# Patient Record
Sex: FEMALE | Race: ASIAN | NOT HISPANIC OR LATINO | Employment: PART TIME | URBAN - METROPOLITAN AREA
[De-identification: names, ages, dates, MRNs, and addresses within clinical notes are randomized per-mention and may not be internally consistent; named-entity substitution may affect disease eponyms.]

---

## 2017-01-03 LAB
ABO + RH BLD: NORMAL
C TRACH RRNA SPEC QL PROBE: NEGATIVE
HBV SURFACE AG SERPL QL IA: NEGATIVE
HCT VFR BLD AUTO: 40 %
HEMOGLOBIN BANDS: NORMAL
HGB BLD-MCNC: 13 G/DL
HIV 1+2 AB+HIV1 P24 AG SERPL QL IA: NEGATIVE
INDIRECT COOMBS: NEGATIVE
MCV RBC AUTO: 86 FL (ref 82–108)
N GONORRHOEAE, AMPLIFIED DNA: NEGATIVE
PLATELET # BLD AUTO: 373 K/ΜL (ref 150–399)
RPR: NEGATIVE
RUBELLA IMMUNE STATUS: NORMAL
URINE CULTURE, ROUTINE: NEGATIVE

## 2017-01-16 ENCOUNTER — TELEPHONE (OUTPATIENT)
Dept: OBSTETRICS AND GYNECOLOGY | Facility: CLINIC | Age: 32
End: 2017-01-16

## 2017-01-16 NOTE — TELEPHONE ENCOUNTER
Returned call. Pt stated that she went to another clinic and had a pos UPT, BHcg of 57,000 and an ultrasound. Pt denies being given a due date. Pt stated that she wasn't given any information about pregnancy and wants to see another OB/GYN. Scheduled appt per pt request. Pt voiced understanding.

## 2017-01-16 NOTE — TELEPHONE ENCOUNTER
----- Message from Antonia Watson sent at 1/16/2017  9:11 AM CST -----  Contact: Self  Pt is calling in regards of scheduling an initial OB appt. LMP = no cycle. The pt states that her last physician states that she is having an infection and is needing treatment if possible. The pt also states that the clinic that she went to didn't give her information regarding her pregnancy, which is why she is wanting to transfer care.The pt can be reached at [phone]. Thanks KG

## 2017-01-16 NOTE — TELEPHONE ENCOUNTER
----- Message from Destiney Hess sent at 1/16/2017 10:37 AM CST -----  Contact: pt   X_  1st Request  _  2nd Request  _  3rd Request    Who:TIFFANIE SANDOVAL [[id number]]    Why: Patient states she is retuning a call     What Number to Call Back: [phone]    When to Expect a call back: (Before the end of the day)   -- if call after 3:00 call back will be tomorrow.

## 2017-01-24 ENCOUNTER — INITIAL PRENATAL (OUTPATIENT)
Dept: OBSTETRICS AND GYNECOLOGY | Facility: CLINIC | Age: 32
End: 2017-01-24
Payer: MEDICAID

## 2017-01-24 VITALS — SYSTOLIC BLOOD PRESSURE: 118 MMHG | DIASTOLIC BLOOD PRESSURE: 74 MMHG | WEIGHT: 138.88 LBS

## 2017-01-24 DIAGNOSIS — Z34.01 ENCOUNTER FOR SUPERVISION OF NORMAL FIRST PREGNANCY IN FIRST TRIMESTER: ICD-10-CM

## 2017-01-24 PROBLEM — Z34.00 SUPERVISION OF NORMAL FIRST PREGNANCY: Status: ACTIVE | Noted: 2017-01-24

## 2017-01-24 PROCEDURE — 99999 PR PBB SHADOW E&M-EST. PATIENT-LVL III: CPT | Mod: PBBFAC,,, | Performed by: OBSTETRICS & GYNECOLOGY

## 2017-01-24 PROCEDURE — 99202 OFFICE O/P NEW SF 15 MIN: CPT | Mod: TH,S$PBB,, | Performed by: OBSTETRICS & GYNECOLOGY

## 2017-01-24 PROCEDURE — 99213 OFFICE O/P EST LOW 20 MIN: CPT | Mod: PBBFAC,PO | Performed by: OBSTETRICS & GYNECOLOGY

## 2017-01-24 RX ORDER — ONDANSETRON 4 MG/1
8 TABLET, ORALLY DISINTEGRATING ORAL EVERY 8 HOURS
Qty: 60 TABLET | Refills: 3 | Status: SHIPPED | OUTPATIENT
Start: 2017-01-24 | End: 2017-06-29 | Stop reason: SDUPTHER

## 2017-01-24 NOTE — MR AVS SNAPSHOT
[facility]  [address] [facility] [address]  Phone: [phone]                  Tere Peña   2017 10:00 AM   Initial Prenatal    Description:  Unknown : 1985   Provider:  Maya Falcon MD   Department:  [facility]           Reason for Visit     Initial Prenatal Visit           Diagnoses this Visit        Comments    Encounter for supervision of normal first pregnancy in first trimester                To Do List           Goals (5 Years of Data)     None      Follow-Up and Disposition     Return in about 4 weeks (around 2017) for ROB. [facility]     [facility] Nurse Care Line -  Assistance  Registered nurses in the [facility] provide clinical advisement, health education, appointment booking, and other advisory services.  Call for this free service at [phone].             Medications           Message regarding Medications     Verify the changes and/or additions to your medication regime listed below are the same as discussed with your clinician today.  If any of these changes or additions are incorrect, please notify your healthcare provider.             Verify that the below list of medications is an accurate representation of the medications you are currently taking.  If none reported, the list may be blank. If incorrect, please contact your healthcare provider. Carry this list with you in case of emergency.           Current Medications     PNV95/FERROUS FUMARATE/FA (PRENATAL MULTIVITAMINS ORAL) Take by mouth.           Clinical Reference Information           Prenatal Vitals     Enc. Date GA Prenatal Vitals Prenatal Pulse Pain Level Urine Albumin/Glucose Edema Presentation Dilation/Effacement/Station    17 8w0d 118/74 / 63 kg (138 lb 14.2 oz)   5 Trace / Trace         Vital Signs - Last Recorded  Most recent update: 2017 10:15 AM by Ashley Parada RN    BP Wt LMP             118/74 63 kg (138 lb 14.2  oz) (LMP Unknown)         Allergies as of 1/24/2017     No Known Allergies      Immunizations Administered on Date of Encounter - 1/24/2017     None      Orders Placed During Today's Visit      Normal Orders This Visit    ABO/Rh     CBC Without Differential     Julisa test, indirect, qualitative     Hbsag - Prenatal     Hemoglobin Electrophoresis,Hgb A2 Rupert.     HIV-1 and HIV-2 antibodies     Ob Cervical Screen     RPR     Rubella antibody, IgG     Urine culture     Future Labs/Procedures Expected by Expires    US OB/GYN Procedure ([facility])  As directed 1/24/2018      [facility] Sign-Up     Activating your [facility] account is as easy as 1-2-3!     1) Visit [email or web address], select Sign Up Now, enter this activation code and your date of birth, then select Next.  [email or web address]  Expires: 2/24/2017  9:48 PM      2) Create a username and password to use when you visit [facility] in the future and select a security question in case you lose your password and select Next.    3) Enter your e-mail address and click Sign Up!    Additional Information  If you have questions, please e-mail [email or web address] or call [phone] to talk to our [facility] staff. Remember, [facility] is NOT to be used for urgent needs. For medical emergencies, dial 911.

## 2017-01-24 NOTE — PROGRESS NOTES
Pt doing well. This is her first pregnancy. No PMH other than PCOS. No surgical history. She is taking PNV. Was seen at outside hospital for initial visit (all labs normal). Has not had dating US. Unsure of LMP as she has PCOS. Reports irregular cramping. Has nausea daily and gagging. No emesis. Has prescription at home as needed -has not yet used it. Denies vaginal bleeding. Is having some tailbone pain for 2 weeks. Worse with sitting. No falls or trauma reported.    VS reviewed.  PE benign. Roughly 8 weeks sized uterus.  Pap normal 1/3/17  Ordered today: TVUS dating scan, flu vaccine. Pt interested in centering.  Precautions given. Discussed safe foods, medications, activities, etc.    RTC: 4 weeks

## 2017-01-25 ENCOUNTER — RESEARCH ENCOUNTER (OUTPATIENT)
Dept: RESEARCH | Facility: HOSPITAL | Age: 32
End: 2017-01-25

## 2017-01-25 ENCOUNTER — PROCEDURE VISIT (OUTPATIENT)
Dept: OBSTETRICS AND GYNECOLOGY | Facility: CLINIC | Age: 32
End: 2017-01-25
Payer: MEDICAID

## 2017-01-25 DIAGNOSIS — Z34.01 ENCOUNTER FOR SUPERVISION OF NORMAL FIRST PREGNANCY IN FIRST TRIMESTER: ICD-10-CM

## 2017-01-25 PROCEDURE — 76801 OB US < 14 WKS SINGLE FETUS: CPT | Mod: PBBFAC | Performed by: OBSTETRICS & GYNECOLOGY

## 2017-01-25 PROCEDURE — 76801 OB US < 14 WKS SINGLE FETUS: CPT | Mod: 26,S$PBB,, | Performed by: OBSTETRICS & GYNECOLOGY

## 2017-01-25 NOTE — PROGRESS NOTES
2016.165.B TREETOP Consent.     I met with Ms. Peña and FOJOSE C. She is pregnant and here for a visit. I met them in OB ultrasound and escorted them to CTU. We discussed the study in detail, including the purpose, numbers/length, procedures, risk/benefit, cost/payment, contacts (investigators/IRB), alternatives/voluntary nature/withdrawal, confidentiality/HIPPA. Dr. Marroquin  was available for questions. She verbalized understanding and had no questions at this time. She consented to optional blood storage and genetic sequencing. The consent form was signed on 1/25/17 at 9:45 am and patient was given a copy.     She is not participating in any other research studies.

## 2017-02-06 ENCOUNTER — TELEPHONE (OUTPATIENT)
Dept: OBSTETRICS AND GYNECOLOGY | Facility: CLINIC | Age: 32
End: 2017-02-06

## 2017-02-06 ENCOUNTER — PATIENT MESSAGE (OUTPATIENT)
Dept: OBSTETRICS AND GYNECOLOGY | Facility: CLINIC | Age: 32
End: 2017-02-06

## 2017-02-06 DIAGNOSIS — M54.9 BACK PAIN AFFECTING PREGNANCY: ICD-10-CM

## 2017-02-06 DIAGNOSIS — Z34.01 ENCOUNTER FOR SUPERVISION OF NORMAL FIRST PREGNANCY IN FIRST TRIMESTER: Primary | ICD-10-CM

## 2017-02-06 DIAGNOSIS — O99.891 BACK PAIN AFFECTING PREGNANCY: ICD-10-CM

## 2017-02-06 NOTE — TELEPHONE ENCOUNTER
"States still having problems with "tailbone pain", states was having before she got pregnant and now getting worse. Rec: try warm compresses and Tylenol. Pt states wants to talk to Dr Falcon about chromosome testing, states concerned about this and her parents are concerned. States she thinks she is farther along in pregnancy and has questions about ultrasound. Will pass message along to Dr Falcon.  "

## 2017-02-06 NOTE — TELEPHONE ENCOUNTER
----- Message from Sinai Ernst sent at 2/6/2017  1:04 PM CST -----  Contact: self  Patient states experiencing severe in buttock area   Please call pt for more detail info [phone]

## 2017-02-06 NOTE — TELEPHONE ENCOUNTER
----- Message from Raudel Sims sent at 2/6/2017  9:22 AM CST -----  12wks ob pt states that she is having tail bone around the whole butt. Pt can be reached at [phone].

## 2017-02-06 NOTE — TELEPHONE ENCOUNTER
Returned call. Pt wants to speak with Dr Falcon concerning genetic testing. Pt has several questions. Pt can be reached at .

## 2017-02-06 NOTE — TELEPHONE ENCOUNTER
Returned call.  Desires nt testing.  She continue to have pain near her coccyx.  Please schedule with physical therapy

## 2017-02-10 NOTE — TELEPHONE ENCOUNTER
Anais in Physical Therapy called back and stated that they spoke with pt on Wednesday and she stated that her pain has subsided and she no longer needs physical therapy.

## 2017-02-17 ENCOUNTER — LAB VISIT (OUTPATIENT)
Dept: LAB | Facility: OTHER | Age: 32
End: 2017-02-17
Attending: OBSTETRICS & GYNECOLOGY
Payer: MEDICAID

## 2017-02-17 ENCOUNTER — OFFICE VISIT (OUTPATIENT)
Dept: MATERNAL FETAL MEDICINE | Facility: CLINIC | Age: 32
End: 2017-02-17
Attending: OBSTETRICS & GYNECOLOGY
Payer: MEDICAID

## 2017-02-17 VITALS — WEIGHT: 141.75 LBS

## 2017-02-17 DIAGNOSIS — Z36.82 ENCOUNTER FOR NUCHAL TRANSLUCENCY TESTING: ICD-10-CM

## 2017-02-17 DIAGNOSIS — Z34.01 ENCOUNTER FOR SUPERVISION OF NORMAL FIRST PREGNANCY IN FIRST TRIMESTER: ICD-10-CM

## 2017-02-17 DIAGNOSIS — Z36.89 ENCOUNTER FOR FETAL ANATOMIC SURVEY: Primary | ICD-10-CM

## 2017-02-17 PROCEDURE — 99999 PR PBB SHADOW E&M-EST. PATIENT-LVL I: CPT | Mod: PBBFAC,,,

## 2017-02-17 PROCEDURE — 81508 FTL CGEN ABNOR TWO PROTEINS: CPT

## 2017-02-17 PROCEDURE — 36415 COLL VENOUS BLD VENIPUNCTURE: CPT

## 2017-02-17 PROCEDURE — 76801 OB US < 14 WKS SINGLE FETUS: CPT | Mod: 26,S$PBB,, | Performed by: PEDIATRICS

## 2017-02-17 PROCEDURE — 99499 UNLISTED E&M SERVICE: CPT | Mod: S$PBB,,, | Performed by: PEDIATRICS

## 2017-02-17 PROCEDURE — 76813 OB US NUCHAL MEAS 1 GEST: CPT | Mod: 26,S$PBB,, | Performed by: PEDIATRICS

## 2017-02-17 NOTE — PROGRESS NOTES
Indication: nuchal translucency screening(MARILEE SARGENT).   ____________________________________________________________________________  History: Age: 31 years. LMP not sure.  ____________________________________________________________________________  Dating:    Previous Scan on: 01/25/17 EDC: 08/26/17 GA by prev. scan: 12w6d      Best Overall Assessment: 01/25/17 EDC: 08/26/17 Assessed GA: 12w6d  The Best Overall Assessment is based on the ultrasound examination on 01/25/17.  The calculation of the gestational age was then based on  CRL.  ____________________________________________________________________________  First Trimester Scan:  Ward gestation.      Biometry:    CRL 69.4 mm 66th% 13w1d (12w4d to 13w5d)  NT 1.41 Mm      Fetal Anatomy:    Skull / Brain: appears normal. Abdomen: appears normal. Stomach: visible. Bladder: visible. Hands: both visible. Feet: both visible.      Fetal heart activity: present. Fetal heart rate: 165 bpm.   Amniotic fluid: normal.   Cord: 3 vessels. Placenta: posterior.       Summary of Ultrasound Findings:  Transabdominal US. U/S machine: trinket. U/S view: good.       Impression: normal intrauterine pregnancy.     ____________________________________________________________________________  Maternal Structures:    Uterus: normal.  Cervix: normal.  Right Ovary: normal.   Right Ovary size: 24 mm x 28 mm x 21 mm. Volume: 7.4 ml.   Left Ovary: normal.   Left Ovary size: 26 mm x 34 mm x 17 mm. Volume: 7.9 ml.  Cul de Sac / Pouch of Salbador: no free fluid visible.  ____________________________________________________________________________  Report Summary:      Impression:     Single viable intrauterine pregnancy consistent with established dating.  Fetus grossly WNL with normal cardiac activity.    No evidence of nuchal translucency thickening visualized today.  Normal uterus, cervix and adnexae as noted above.    No fluid seen in cul-de-sac.     Recommendations:     First  portion of sequential screening completed today. Results to be sent to primary pregnancy care provider. Recommend to complete second blood draw beyond 15 weeks EGA of pregnancy. Ultrasound for fetal anatomical survey scheduled by MFM today for 19-20 weeks EGA.     Thank you for allowing us to participate in the care of your patients.  If you have any questions concerning today's consultation, please feel free to contact me or one of my partners.  We can be reached at [phone] during normal business hours.  If you have a question after normal business hours, please contact Labor and Delivery [phone] and the unit secretary will page our on call physician.

## 2017-02-17 NOTE — LETTER
February 17, 2017      Maya Falcon MD  [address]  [address]  [facility] [address]           [facility]  [address]  [facility] [address]  Phone: [phone]          Patient: Tere Peña   MR Number: [id number]   YOB: 1985   Date of Visit: 2/17/2017       Dear Dr. Maya Falcon:    Thank you for referring Tere Peña to me for evaluation. Attached you will find relevant portions of my assessment and plan of care.    If you have questions, please do not hesitate to call me. I look forward to following Tere Peña along with you.    Sincerely,    Allie Patel MD    Enclosure  CC:  No Recipients    If you would like to receive this communication electronically, please contact [email or web address] or [phone] to request more information on [employer] Link access.    For providers and/or their staff who would like to refer a patient to [facility], please contact us through our one-stop-shop provider referral line, [facility], at [phone].    If you feel you have received this communication in error or would no longer like to receive these types of communications, please e-mail [email or web address]

## 2017-02-20 LAB
B-HCG (M.O.M.) (SS1): NORMAL
CRL MEASUREMENT (SS1): 69.4 MM
DOWN SYNDROME (<1:25) (SS1): NORMAL
DS BASED ON MAT. AGE (SS1): NORMAL
ETHNIC ORIGIN (SS1): NORMAL
GESTATIONAL AGE (DAYS)(SS1): 1
GESTATIONAL AGE (WEEKS)(SS1): 13
HCG (SS1): 113.6 IU/ML
INSULIN DEPEND. DIABETES (SS1): NORMAL
MATERNAL AGE AT EDD (YRS) (SS1): 32
MATERNAL WEIGHT (LBS) (SS1): 142
MULTIPLE GESTATION (SS1): NORMAL
NASAL BONE (SS1): NORMAL
NUCHAL TRANSL. (M.O.M.) (SS1): NORMAL
NUCHAL TRANSLUCENCY (SS1): 1.4 MM
PAPP-A (M.O.M.) (SS1): NORMAL
PAPP-A (SS1): NORMAL NG/ML
SEQ. SCREEN PART 1: NEGATIVE
SEQUENTIAL SCREEN I INTERP.: NORMAL
TRISOMY 18 (<1:100) (SS1): NORMAL
ULTRASOUND DATE (SS1): NORMAL

## 2017-02-21 ENCOUNTER — ROUTINE PRENATAL (OUTPATIENT)
Dept: OBSTETRICS AND GYNECOLOGY | Facility: CLINIC | Age: 32
End: 2017-02-21
Payer: MEDICAID

## 2017-02-21 VITALS — WEIGHT: 140.19 LBS | SYSTOLIC BLOOD PRESSURE: 110 MMHG | DIASTOLIC BLOOD PRESSURE: 60 MMHG

## 2017-02-21 DIAGNOSIS — N91.2 AMENORRHEA: ICD-10-CM

## 2017-02-21 DIAGNOSIS — Z34.02 ENCOUNTER FOR SUPERVISION OF NORMAL FIRST PREGNANCY IN SECOND TRIMESTER: Primary | ICD-10-CM

## 2017-02-21 DIAGNOSIS — Z32.01 POSITIVE PREGNANCY TEST: ICD-10-CM

## 2017-02-21 PROCEDURE — 99213 OFFICE O/P EST LOW 20 MIN: CPT | Mod: TH,S$PBB,, | Performed by: STUDENT IN AN ORGANIZED HEALTH CARE EDUCATION/TRAINING PROGRAM

## 2017-02-21 PROCEDURE — 99999 PR PBB SHADOW E&M-EST. PATIENT-LVL II: CPT | Mod: PBBFAC,,, | Performed by: STUDENT IN AN ORGANIZED HEALTH CARE EDUCATION/TRAINING PROGRAM

## 2017-02-21 PROCEDURE — 87480 CANDIDA DNA DIR PROBE: CPT

## 2017-02-21 PROCEDURE — 99212 OFFICE O/P EST SF 10 MIN: CPT | Mod: PBBFAC,PO | Performed by: STUDENT IN AN ORGANIZED HEALTH CARE EDUCATION/TRAINING PROGRAM

## 2017-02-21 RX ORDER — FLUCONAZOLE 150 MG/1
150 TABLET ORAL ONCE
Qty: 1 TABLET | Refills: 1 | Status: SHIPPED | OUTPATIENT
Start: 2017-02-21 | End: 2017-02-21

## 2017-02-21 NOTE — PROGRESS NOTES
Complaints today: thinks she has a yeast infection for the past 3 days. Also having nausea and vomiting all day most days. Weight loss 64.3 > 63.6 since last seen in clinic.    Visit Vitals    /60    Wt 63.6 kg (140 lb 3.4 oz)    LMP  (LMP Unknown)       31 y.o., at 13w3d by Estimated Date of Delivery: 17  Patient Active Problem List   Diagnosis    Supervision of normal first pregnancy     OB History    Para Term  AB SAB TAB Ectopic Multiple Living   1               # Outcome Date GA Lbr Bhupinder/2nd Weight Sex Delivery Anes PTL Lv   1 Current                   Dating reviewed  Allergies and problem list reviewed and updated  Medical and surgical history reviewed  Prenatal labs reviewed and updated    Physical Exam:  ABD: soft, gravid, nontender  FHT verified  SSE: with copious white discharge, affirm collected; cervical and vaginal mucosa is intact and normal in appearance    Assessment:  IUP @ 13w3d    Plan:     Nausea, vomiting  doxylamine: (eg, Unisom Sleep Tabs): One-half of the 25 mg OTC tablet nightly  Pyridoxine: 25 mg 3-4x/day    IUP  RTC 4 weeks  Anatomy US has been ordered    Yeast infection  Affirm sent  Diflucan 150 mg to pharmacy

## 2017-02-21 NOTE — MR AVS SNAPSHOT
[facility]  [address] [facility] [address]  Phone: [phone]                  Tere Peña   2017 8:30 AM   Routine Prenatal    Description:  Female : 1985   Provider:  Torin Castaneda MD   Department:  [facility]           Reason for Visit     Routine Prenatal Visit     Tailbone Pain           Diagnoses this Visit        Comments    Encounter for supervision of normal first pregnancy in second trimester    -  Primary     Positive pregnancy test         Amenorrhea                To Do List           Future Appointments        Provider Department Dept Phone    2017 1:00 PM Maya Falcon MD [facility] OB/ -[phone]      Goals (5 Years of Data)     None      Follow-Up and Disposition     Return in about 4 weeks (around 3/21/2017).      [facility]     [facility] Nurse Care Line - 24/7 Assistance  Registered nurses in the [facility] provide clinical advisement, health education, appointment booking, and other advisory services.  Call for this free service at [phone].             Medications           Message regarding Medications     Verify the changes and/or additions to your medication regime listed below are the same as discussed with your clinician today.  If any of these changes or additions are incorrect, please notify your healthcare provider.             Verify that the below list of medications is an accurate representation of the medications you are currently taking.  If none reported, the list may be blank. If incorrect, please contact your healthcare provider. Carry this list with you in case of emergency.           Current Medications     ondansetron (ZOFRAN-ODT) 4 MG TbDL Take 2 tablets (8 mg total) by mouth every 8 (eight) hours.    PNV95/FERROUS FUMARATE/FA (PRENATAL MULTIVITAMINS ORAL) Take by mouth.           Clinical Reference Information           Prenatal Vitals     Enc. Date GA Prenatal Vitals  Prenatal Pulse Pain Level Urine Albumin/Glucose Edema Presentation Dilation/Effacement/Station    2/21/17 13w3d 110/60 / 63.6 kg (140 lb 3.4 oz)   0 Negative / Negative None / None      2/17/17 12w6d  / 64.3 kg (141 lb 12.1 oz)           1/24/17 8w0d 118/74 / 63 kg (138 lb 14.2 oz)   5 Trace / Trace         Your Vitals Were     BP Weight Last Period             110/60 63.6 kg (140 lb 3.4 oz) (LMP Unknown)         Allergies as of 2/21/2017     No Known Allergies      Immunizations Administered on Date of Encounter - 2/21/2017     None      Language Assistance Services     ATTENTION: Language assistance services are available, free of charge. Please call [phone].      ATENCIÓN: Si habla judied, tiene a clancy disposición servicios gratuitos de asistencia lingüística. Llame al [phone].     CHÚ Ý: N?u b?n nói Ti?ng Vi?t, có các d?ch v? h? tr? ngôn ng? mi?n phí dành cho b?n. G?i s? [phone].         [facility] complies with applicable Federal civil rights laws and does not discriminate on the basis of race, color, national origin, age, disability, or sex.

## 2017-02-21 NOTE — LETTER
February 21, 2017    Tere Peña  [address] [place] [address]             [facility]  [address] [facility] [address]  Phone: [phone] To whom it may concern:    I am seeing Ms. Peña for her pregnancy.  Estimated Date of Delivery: 8/26/17.  She has my permission to exercise at the gym during this pregnancy.      If you have any questions or concerns, please don't hesitate to call.    Sincerely,        Maya Falcon MD

## 2017-02-22 ENCOUNTER — PATIENT MESSAGE (OUTPATIENT)
Dept: OBSTETRICS AND GYNECOLOGY | Facility: CLINIC | Age: 32
End: 2017-02-22

## 2017-02-22 LAB
CANDIDA RRNA VAG QL PROBE: POSITIVE
G VAGINALIS RRNA GENITAL QL PROBE: NEGATIVE
T VAGINALIS RRNA GENITAL QL PROBE: NEGATIVE

## 2017-02-22 RX ORDER — FLUCONAZOLE 150 MG/1
150 TABLET ORAL DAILY
Qty: 1 TABLET | Refills: 1 | Status: SHIPPED | OUTPATIENT
Start: 2017-02-22 | End: 2017-02-23

## 2017-03-21 ENCOUNTER — ROUTINE PRENATAL (OUTPATIENT)
Dept: OBSTETRICS AND GYNECOLOGY | Facility: CLINIC | Age: 32
End: 2017-03-21
Payer: MEDICAID

## 2017-03-21 VITALS — WEIGHT: 145.75 LBS | DIASTOLIC BLOOD PRESSURE: 56 MMHG | SYSTOLIC BLOOD PRESSURE: 112 MMHG

## 2017-03-21 DIAGNOSIS — Z34.02 ENCOUNTER FOR SUPERVISION OF NORMAL FIRST PREGNANCY IN SECOND TRIMESTER: Primary | ICD-10-CM

## 2017-03-21 PROCEDURE — 99999 PR PBB SHADOW E&M-EST. PATIENT-LVL II: CPT | Mod: PBBFAC,,, | Performed by: STUDENT IN AN ORGANIZED HEALTH CARE EDUCATION/TRAINING PROGRAM

## 2017-03-21 PROCEDURE — 99212 OFFICE O/P EST SF 10 MIN: CPT | Mod: PBBFAC,PO | Performed by: STUDENT IN AN ORGANIZED HEALTH CARE EDUCATION/TRAINING PROGRAM

## 2017-03-21 PROCEDURE — 99213 OFFICE O/P EST LOW 20 MIN: CPT | Mod: TH,S$PBB,, | Performed by: STUDENT IN AN ORGANIZED HEALTH CARE EDUCATION/TRAINING PROGRAM

## 2017-03-21 RX ORDER — BUTALBITAL, ACETAMINOPHEN AND CAFFEINE 50; 325; 40 MG/1; MG/1; MG/1
1 TABLET ORAL EVERY 4 HOURS PRN
Qty: 30 TABLET | Refills: 3 | Status: SHIPPED | OUTPATIENT
Start: 2017-03-21 | End: 2017-04-20

## 2017-03-21 NOTE — MR AVS SNAPSHOT
[facility]  [address] [facility] [address]  Phone: [phone]                  Tere Peña   3/21/2017 10:45 AM   Routine Prenatal    Description:  Female : 1985   Provider:  Torin Castaneda MD   Department:  [facility]           Reason for Visit     Routine Prenatal Visit           Diagnoses this Visit        Comments    Encounter for supervision of normal first pregnancy in second trimester    -  Primary            To Do List           Future Appointments        Provider Department Dept Phone    4/3/2017 8:00 AM ULTRASOUND, [facility]TH [facility] [phone]    2017 1:00 PM Maya Falcon MD [facility] OB/ -[phone]      Goals (5 Years of Data)     None      [facility]     [facility] Care [facility] -  Assistance  Registered nurses in the [facility] provide clinical advisement, health education, appointment booking, and other advisory services.  Call for this free service at [phone].             Medications           Message regarding Medications     Verify the changes and/or additions to your medication regime listed below are the same as discussed with your clinician today.  If any of these changes or additions are incorrect, please notify your healthcare provider.             Verify that the below list of medications is an accurate representation of the medications you are currently taking.  If none reported, the list may be blank. If incorrect, please contact your healthcare provider. Carry this list with you in case of emergency.           Current Medications     ondansetron (ZOFRAN-ODT) 4 MG TbDL Take 2 tablets (8 mg total) by mouth every 8 (eight) hours.    PNV95/FERROUS FUMARATE/FA (PRENATAL MULTIVITAMINS ORAL) Take by mouth.           Clinical Reference Information           Prenatal Vitals     Enc. Date GA Prenatal Vitals Prenatal Pulse Pain Level Urine Albumin/Glucose Edema  Presentation Dilation/Effacement/Station    3/21/17 17w3d 112/56 (A) / 66.1 kg (145 lb 11.6 oz)   0 Negative / Negative       2/21/17 13w3d 110/60 / 63.6 kg (140 lb 3.4 oz)  / 152  0 Negative / Negative None / None      2/17/17 12w6d  / 64.3 kg (141 lb 12.1 oz)           1/24/17 8w0d 118/74 / 63 kg (138 lb 14.2 oz)   5 Trace / Trace         Your Vitals Were     BP Weight Last Period             112/56 66.1 kg (145 lb 11.6 oz) (LMP Unknown)         Allergies as of 3/21/2017     No Known Allergies      Immunizations Administered on Date of Encounter - 3/21/2017     None      Orders Placed During Today's Visit     Future Labs/Procedures Expected by Expires    Maternal Sequential Screen Part 2  3/21/2017 5/20/2018      Language Assistance Services     ATTENTION: Language assistance services are available, free of charge. Please call [phone].      ATENCIÓN: Si habla kwame, tiene a clancy disposición servicios gratuitos de asistencia lingüística. Llame al [phone].     [facility] Ý: N?u b?n nói Ti?ng Vi?t, có các d?ch v? h? tr? ngôn ng? mi?n phí dành cho b?n. G?i s? [phone].         [facility] complies with applicable Federal civil rights laws and does not discriminate on the basis of race, color, national origin, age, disability, or sex.

## 2017-03-21 NOTE — PROGRESS NOTES
Complaints today: Complains of occasional headaches that are not relieved by Tylenol. Also complains of worsening dandruff over the past few weeks.    BP (!) 112/56  Wt 66.1 kg (145 lb 11.6 oz)  LMP  (LMP Unknown)    31 y.o., at 17w3d by Estimated Date of Delivery: 17  Patient Active Problem List   Diagnosis    Supervision of normal first pregnancy     OB History    Para Term  AB SAB TAB Ectopic Multiple Living   1               # Outcome Date GA Lbr Bhupinder/2nd Weight Sex Delivery Anes PTL Lv   1 Current                   Dating reviewed    Allergies and problem list reviewed and updated    Medical and surgical history reviewed    Prenatal labs reviewed and updated    Physical Exam:  ABD: soft, gravid, nontender    Assessment:  30yo G1 at 17.3 wga presents for routine prenatal    Plan:   Fioricet for headache  Continue using Head and Shoulders for dandruff  Seq screen part 2 ordered     follow up 4 Weeks

## 2017-03-21 NOTE — LETTER
March 21, 2017    Tere Peña  [address] [place] [address]             [facility]  [address] [facility] [address]  Phone: [phone] To whom it may concern:    I am seeing Ms. Peña for her pregnancy.  Estimated Date of Delivery: 8/26/17.      If you have any questions or concerns, please don't hesitate to call.    Sincerely,        Maya Falcon MD

## 2017-03-28 ENCOUNTER — TELEPHONE (OUTPATIENT)
Dept: RESEARCH | Facility: HOSPITAL | Age: 32
End: 2017-03-28

## 2017-04-03 ENCOUNTER — OFFICE VISIT (OUTPATIENT)
Dept: MATERNAL FETAL MEDICINE | Facility: CLINIC | Age: 32
End: 2017-04-03
Payer: MEDICAID

## 2017-04-03 ENCOUNTER — LAB VISIT (OUTPATIENT)
Dept: LAB | Facility: OTHER | Age: 32
End: 2017-04-03
Attending: STUDENT IN AN ORGANIZED HEALTH CARE EDUCATION/TRAINING PROGRAM
Payer: MEDICAID

## 2017-04-03 ENCOUNTER — RESEARCH ENCOUNTER (OUTPATIENT)
Dept: RESEARCH | Facility: HOSPITAL | Age: 32
End: 2017-04-03

## 2017-04-03 ENCOUNTER — PATIENT MESSAGE (OUTPATIENT)
Dept: OBSTETRICS AND GYNECOLOGY | Facility: CLINIC | Age: 32
End: 2017-04-03

## 2017-04-03 DIAGNOSIS — Z36.89 ENCOUNTER FOR FETAL ANATOMIC SURVEY: ICD-10-CM

## 2017-04-03 DIAGNOSIS — Z34.02 ENCOUNTER FOR SUPERVISION OF NORMAL FIRST PREGNANCY IN SECOND TRIMESTER: ICD-10-CM

## 2017-04-03 PROCEDURE — 36415 COLL VENOUS BLD VENIPUNCTURE: CPT

## 2017-04-03 PROCEDURE — 76805 OB US >/= 14 WKS SNGL FETUS: CPT | Mod: 26,S$PBB,, | Performed by: PEDIATRICS

## 2017-04-03 PROCEDURE — 99499 UNLISTED E&M SERVICE: CPT | Mod: S$PBB,,, | Performed by: PEDIATRICS

## 2017-04-03 PROCEDURE — 81511 FTL CGEN ABNOR FOUR ANAL: CPT

## 2017-04-03 NOTE — PROGRESS NOTES
2016.165.B TREETOP Consent.      This morning I met with Ms. Mariana and JAMAR after her anatomy scan. After her appointment, I escorted them to  to have blood drawn. After her blood was drawn, she was given her $25.00 gift card and I escorted them to the [facility].      Blood was drawn at 09:10. Placed in centrifuge at room temperature at 09:30. Minimal hemolysis in both tubes. Tubes were combined at 09:40. Twenty 250 ul aliquots were place in the -80 freezer at 09:46.    She reported she has had headaches, nausea, and a yeast infection, all which were prescribed medication for.

## 2017-04-03 NOTE — PROGRESS NOTES
Indication  ========    Fetal anatomy survey.    Method  ======    Transabdominal ultrasound examination. View: Good view.    Pregnancy  =========    Ward pregnancy. Number of fetuses: 1.    Dating  ======    Cycle: regular cycle  Ultrasound examination on: 4/3/2017  GA by U/S based upon: AC, BPD, Femur, HC  GA by U/S 19 w + 5 d  ELVIN by U/S: 8/23/2017  Assigned: The calculation of the gestational age based on CRL.  The Best Overall Assessment is based on the ultrasound examination on 01/25/17.  Assigned GA 19 w + 2 d  Assigned ELVIN: 8/26/2017    General Evaluation  ==============    Cardiac activity: present.  bpm.  Fetal movements: visualized.  Presentation: Unstable lie.  Placenta: posterior.  Umbilical cord: 3 vessel cord.  Amniotic fluid: normal amount.    Fetal Biometry  ============    Fetal Biometry  BPD 46.5 mm 81% 20w 0d Hadlock  OFD 61.1 mm 95% 21w 0d Luis Angel  .0 mm 70% 19w 6d Hadlock  .9 mm 71% 20w 0d Hadlock  Femur 29.2 mm 32% 19w 0d Hadlock  Cerebellum tr 20.9 mm 78% 20w 4d Reyna  CM 6.8 mm 94% Nicolaides  Nuchal fold 3.04 mm  Humerus 30.0 mm 72% 19w 6d Luis Angel   g 64% 19w 4d Hadlock  Calculated by: Hadlock (BPD-HC-AC-FL)  EFW (lb) 0 lb  EFW (oz) 11 oz  Cephalic index 0.76 21% Nicolaides  HC / AC 1.17 49% Hadlock  FL / BPD 0.63 8% Hadlock  FL / AC 0.20 10% Hadlock   bpm    Fetal Anatomy  ============    Cranium: normal  Lateral ventricles: normal  Choroid plexus: normal  Midline falx: normal  Cavum septi pellucidi: normal  Cerebellum: normal  Cisterna magna: normal  Neck: appears normal  Nuchal fold: normal  Lips: normal  Profile: normal  Nose: normal  RVOT: normal  LVOT: normal  Situs: normal  Aortic arch: normal  Ductal arch: not examined  SVC: normal  IVC: normal  3-vessel view: suboptimal  3-vessel-trachea view: suboptimal  Cardiac axis: normal  Cardiac size: normal  Cardiac rhythm: normal  Rt lung: normal  Lt lung: normal  Diaphragm: normal  Cord  insertion: normal  Stomach: normal  Kidneys: normal  Bladder: normal  Abdom. wall: normal  Rt kidney: normal  Lt kidney: normal  Liver: normal  Rt renal artery: normal  Lt renal artery: normal  Cervical spine: normal  Thoracic spine: normal  Lumbar spine: normal  Sacral spine: normal  Rt hand: normal  Lt hand: normal  Rt foot: normal  Lt foot: normal  Gender: male  Wants to know gender: yes    Maternal Structures  ===============    Uterus / Cervix  Uterus: Normal  Cervical length 36.7 mm  Ovaries / Tubes / Adnexa  Rt ovary: Visualized  Lt ovary: Visualized  Pouch of [place]bador / Other Structures  Cul de Sac: Visualized    Impression  =========    Normal fetal anatomy with no obvious abnormalities.  Biometry is consistent with dating.  Normal amniotic fluid volume per qualitative assessment.  Normal placental location without evidence of previa.  Normal appearing cervical length per trans-abdominal screening.    Recommendation  ==============    Suggest repeat scan as you feel clinically indicated.    Thank you for allowing us to participate in the care of your patients. If you have any questions concerning today's consultation feel free to  contact me or one of my partners. We can be reached at [phone] during normal business hours. If you have a question after normal  business hours, please contact Labor and Delivery [phone] and the unit secretary will page our on call physician.

## 2017-04-04 LAB
1ST SAMPLE DATE (SS2): NORMAL
2ND SAMPLE DATE (SS2): NORMAL
ALPHA FETOPROTEIN MATERNAL (SS2): 60.2 NG/ML
DOWN SYNDROME RISK (<1:110) (SS2): NORMAL
ETHNIC ORIGIN (SS2): NORMAL
GEST. AGE (DAYS) 1ST SAMPLE (SS2): 1
GEST. AGE (DAYS) 2ND SAMPLE (SS2): 4
GEST. AGE (WKS) 1ST SAMPLE (SS2): 13
GEST. AGE (WKS) 2ND SAMPLE (SS2): 19
GESTATIONAL AGE METHOD (SS2): NORMAL
HCG (SS2): 24 IU/ML
INHIBIN A (SS2): 181.2 PG/ML
INSULIN DEPEND. DIABETES (SS2): NORMAL
M.O.M. AFP  (<2.20) (SS2): 1.12
M.O.M. HCG (SS2): 1.23
M.O.M. INHIBIN A (SS2): 1
M.O.M. NUCHAL TRANSLUCENCY (SS2): 0.84
M.O.M. PAPP-A (SS2): 2.31
M.O.M. UNCONJ. ESTRIOL (SS2): 0.87
MATERNAL AGE AT EDD (YRS) (SS2): 32
MATERNAL AGE FOR DOWN (SS2): NORMAL
MATERNAL WEIGHT (LBS) (SS2): 142
MULTIPLE GESTATION (SS2): NORMAL
NASAL BONE (SS2): NORMAL
NUCHAL TRANSLUCENCY (SS2): 1.4 MM
PAPP-A (SS2): NORMAL NG/ML
SEQUENTIAL SCREEN PART 2 INTERP: NORMAL
SEQUENTIAL SCREEN PART 2: NEGATIVE
TRISOMY 18 (<1:100) (SS2): NORMAL
UNCONJUGATED ESTRIOL (SS2): 1.56 NG/ML

## 2017-04-20 ENCOUNTER — ROUTINE PRENATAL (OUTPATIENT)
Dept: OBSTETRICS AND GYNECOLOGY | Facility: CLINIC | Age: 32
End: 2017-04-20
Payer: MEDICAID

## 2017-04-20 VITALS — SYSTOLIC BLOOD PRESSURE: 100 MMHG | DIASTOLIC BLOOD PRESSURE: 62 MMHG | WEIGHT: 153 LBS

## 2017-04-20 DIAGNOSIS — Z34.02 ENCOUNTER FOR SUPERVISION OF NORMAL FIRST PREGNANCY IN SECOND TRIMESTER: Primary | ICD-10-CM

## 2017-04-20 PROCEDURE — 99999 PR PBB SHADOW E&M-EST. PATIENT-LVL II: CPT | Mod: PBBFAC,,, | Performed by: OBSTETRICS & GYNECOLOGY

## 2017-04-20 PROCEDURE — 99213 OFFICE O/P EST LOW 20 MIN: CPT | Mod: TH,S$PBB,, | Performed by: OBSTETRICS & GYNECOLOGY

## 2017-04-20 PROCEDURE — 99212 OFFICE O/P EST SF 10 MIN: CPT | Mod: PBBFAC,PO | Performed by: OBSTETRICS & GYNECOLOGY

## 2017-04-20 NOTE — PROGRESS NOTES
Complaints today: none  Good fm.  Denies ctx, vb, lof.  Centering in pregnancy session 1.  Discussed personal goals, prenatal testing, nutrition, healthy choices.    /62  Wt 69.4 kg (153 lb)  LMP  (LMP Unknown)    31 y.o., at 21w5d by Estimated Date of Delivery: 17  Patient Active Problem List   Diagnosis    Supervision of normal first pregnancy     OB History    Para Term  AB SAB TAB Ectopic Multiple Living   1               # Outcome Date GA Lbr Bhupinder/2nd Weight Sex Delivery Anes PTL Lv   1 Current                   Dating reviewed    Allergies and problem list reviewed and updated    Medical and surgical history reviewed    Prenatal labs reviewed and updated    Physical Exam:  ABD: soft, gravid, nontender,     Assessment:  Diagnoses and all orders for this visit:    Encounter for supervision of normal first pregnancy in second trimester         Plan:      follow up 4Weeks, kick counts, labor precautions

## 2017-05-11 ENCOUNTER — ROUTINE PRENATAL (OUTPATIENT)
Dept: OBSTETRICS AND GYNECOLOGY | Facility: CLINIC | Age: 32
End: 2017-05-11
Payer: MEDICAID

## 2017-05-11 VITALS — DIASTOLIC BLOOD PRESSURE: 69 MMHG | WEIGHT: 159 LBS | SYSTOLIC BLOOD PRESSURE: 104 MMHG

## 2017-05-11 DIAGNOSIS — Z34.02 ENCOUNTER FOR SUPERVISION OF NORMAL FIRST PREGNANCY IN SECOND TRIMESTER: Primary | ICD-10-CM

## 2017-05-11 PROCEDURE — 99999 PR PBB SHADOW E&M-EST. PATIENT-LVL I: CPT | Mod: PBBFAC,,, | Performed by: OBSTETRICS & GYNECOLOGY

## 2017-05-11 PROCEDURE — 99211 OFF/OP EST MAY X REQ PHY/QHP: CPT | Mod: PBBFAC,PO | Performed by: OBSTETRICS & GYNECOLOGY

## 2017-05-11 PROCEDURE — 99213 OFFICE O/P EST LOW 20 MIN: CPT | Mod: TH,S$PBB,, | Performed by: OBSTETRICS & GYNECOLOGY

## 2017-05-11 NOTE — PROGRESS NOTES
Complaints today: none  Good fm.  Denies ctx, vb, lof.  Centering session 2 today: Discussed common discomforts in pregnancy. Body changes in pregnancy, healthy teeth and gums  .    /69  Wt 72.1 kg (159 lb)  LMP  (LMP Unknown)    31 y.o., at 24w5d by Estimated Date of Delivery: 17  Patient Active Problem List   Diagnosis    Supervision of normal first pregnancy     OB History    Para Term  AB SAB TAB Ectopic Multiple Living   1               # Outcome Date GA Lbr Bhupinder/2nd Weight Sex Delivery Anes PTL Lv   1 Current                   Dating reviewed    Allergies and problem list reviewed and updated    Medical and surgical history reviewed    Prenatal labs reviewed and updated    Physical Exam:  ABD: soft, gravid, nontender,     Assessment:  Diagnoses and all orders for this visit:    Encounter for supervision of normal first pregnancy in second trimester  -     CBC auto differential; Future  -     OB Glucose Screen; Future         Plan:   follow up labs   follow up 4 Weeks, kick counts, labor precautions

## 2017-05-15 ENCOUNTER — LAB VISIT (OUTPATIENT)
Dept: LAB | Facility: OTHER | Age: 32
End: 2017-05-15
Attending: OBSTETRICS & GYNECOLOGY
Payer: MEDICAID

## 2017-05-15 DIAGNOSIS — Z34.02 ENCOUNTER FOR SUPERVISION OF NORMAL FIRST PREGNANCY IN SECOND TRIMESTER: ICD-10-CM

## 2017-05-15 LAB
BASOPHILS # BLD AUTO: ABNORMAL K/UL
BASOPHILS NFR BLD: 0 %
DIFFERENTIAL METHOD: ABNORMAL
EOSINOPHIL # BLD AUTO: ABNORMAL K/UL
EOSINOPHIL NFR BLD: 1 %
ERYTHROCYTE [DISTWIDTH] IN BLOOD BY AUTOMATED COUNT: 13.2 %
GLUCOSE SERPL-MCNC: 105 MG/DL
HCT VFR BLD AUTO: 33.8 %
HGB BLD-MCNC: 11.3 G/DL
LYMPHOCYTES # BLD AUTO: ABNORMAL K/UL
LYMPHOCYTES NFR BLD: 13 %
MCH RBC QN AUTO: 30.8 PG
MCHC RBC AUTO-ENTMCNC: 33.4 %
MCV RBC AUTO: 92 FL
MONOCYTES # BLD AUTO: ABNORMAL K/UL
MONOCYTES NFR BLD: 4 %
MYELOCYTES NFR BLD MANUAL: 2 %
NEUTROPHILS # BLD AUTO: ABNORMAL K/UL
NEUTROPHILS NFR BLD: 79 %
NEUTS BAND NFR BLD MANUAL: 1 %
PLATELET # BLD AUTO: 268 K/UL
PLATELET BLD QL SMEAR: ABNORMAL
PMV BLD AUTO: 9.3 FL
RBC # BLD AUTO: 3.67 M/UL
WBC # BLD AUTO: 12.47 K/UL

## 2017-05-15 PROCEDURE — 85027 COMPLETE CBC AUTOMATED: CPT

## 2017-05-15 PROCEDURE — 36415 COLL VENOUS BLD VENIPUNCTURE: CPT

## 2017-05-15 PROCEDURE — 85007 BL SMEAR W/DIFF WBC COUNT: CPT

## 2017-05-15 PROCEDURE — 82950 GLUCOSE TEST: CPT

## 2017-05-18 ENCOUNTER — PATIENT MESSAGE (OUTPATIENT)
Dept: OBSTETRICS AND GYNECOLOGY | Facility: CLINIC | Age: 32
End: 2017-05-18

## 2017-06-08 ENCOUNTER — ROUTINE PRENATAL (OUTPATIENT)
Dept: OBSTETRICS AND GYNECOLOGY | Facility: CLINIC | Age: 32
End: 2017-06-08
Payer: MEDICAID

## 2017-06-08 VITALS — WEIGHT: 162 LBS | SYSTOLIC BLOOD PRESSURE: 100 MMHG | DIASTOLIC BLOOD PRESSURE: 67 MMHG

## 2017-06-08 DIAGNOSIS — Z34.03 ENCOUNTER FOR SUPERVISION OF NORMAL FIRST PREGNANCY IN THIRD TRIMESTER: Primary | ICD-10-CM

## 2017-06-08 PROCEDURE — 99213 OFFICE O/P EST LOW 20 MIN: CPT | Mod: TH,S$PBB,, | Performed by: OBSTETRICS & GYNECOLOGY

## 2017-06-08 PROCEDURE — 99999 PR PBB SHADOW E&M-EST. PATIENT-LVL II: CPT | Mod: PBBFAC,,, | Performed by: OBSTETRICS & GYNECOLOGY

## 2017-06-08 PROCEDURE — 99212 OFFICE O/P EST SF 10 MIN: CPT | Mod: PBBFAC,PO | Performed by: OBSTETRICS & GYNECOLOGY

## 2017-06-08 NOTE — PROGRESS NOTES
Complaints today: heartburn  Good fm.  Denies ctx, vb, lof.  Centering session 3 today:  Discussed relaxation, managing stress, breastfeeding, and thinking about your family.    /67   Wt 73.5 kg (162 lb)   LMP  (LMP Unknown)     31 y.o., at 28w5d by Estimated Date of Delivery: 17  Patient Active Problem List   Diagnosis    Supervision of normal first pregnancy     OB History    Para Term  AB Living   1        SAB TAB Ectopic Multiple Live Births             # Outcome Date GA Lbr Bhupinder/2nd Weight Sex Delivery Anes PTL Lv   1 Current                   Dating reviewed    Allergies and problem list reviewed and updated    Medical and surgical history reviewed    Prenatal labs reviewed and updated    Physical Exam:  ABD: soft, gravid, nontender,     Assessment:  Tere was seen today for routine prenatal visit.    Diagnoses and all orders for this visit:    Encounter for supervision of normal first pregnancy in third trimester          Plan:      follow up 2 Weeks, kick counts, labor precautions

## 2017-06-18 ENCOUNTER — PATIENT MESSAGE (OUTPATIENT)
Dept: OBSTETRICS AND GYNECOLOGY | Facility: CLINIC | Age: 32
End: 2017-06-18

## 2017-06-29 ENCOUNTER — ROUTINE PRENATAL (OUTPATIENT)
Dept: OBSTETRICS AND GYNECOLOGY | Facility: CLINIC | Age: 32
End: 2017-06-29
Payer: MEDICAID

## 2017-06-29 VITALS — WEIGHT: 163 LBS | DIASTOLIC BLOOD PRESSURE: 70 MMHG | SYSTOLIC BLOOD PRESSURE: 114 MMHG

## 2017-06-29 DIAGNOSIS — Z34.03 ENCOUNTER FOR SUPERVISION OF NORMAL FIRST PREGNANCY IN THIRD TRIMESTER: Primary | ICD-10-CM

## 2017-06-29 DIAGNOSIS — M65.4 DE QUERVAIN'S TENOSYNOVITIS: ICD-10-CM

## 2017-06-29 PROCEDURE — 99999 PR PBB SHADOW E&M-EST. PATIENT-LVL II: CPT | Mod: PBBFAC,,, | Performed by: OBSTETRICS & GYNECOLOGY

## 2017-06-29 PROCEDURE — 99212 OFFICE O/P EST SF 10 MIN: CPT | Mod: PBBFAC,PO | Performed by: OBSTETRICS & GYNECOLOGY

## 2017-06-29 PROCEDURE — 99213 OFFICE O/P EST LOW 20 MIN: CPT | Mod: TH,S$PBB,, | Performed by: OBSTETRICS & GYNECOLOGY

## 2017-06-29 RX ORDER — ONDANSETRON 4 MG/1
8 TABLET, ORALLY DISINTEGRATING ORAL EVERY 8 HOURS
Qty: 60 TABLET | Refills: 3 | Status: SHIPPED | OUTPATIENT
Start: 2017-06-29

## 2017-06-29 NOTE — PROGRESS NOTES
Complaints today: wrist pain  Good fm.  Denies ctx, vb, lof.  She attended session 4 of centering.  We discussed family planning, safe sex, domestic violence and abuse, fetal brain development,  labor, and thinking about my family for 2 hour.    /70   Wt 73.9 kg (163 lb)   LMP  (LMP Unknown)     31 y.o., at 31w5d by Estimated Date of Delivery: 17  Patient Active Problem List   Diagnosis    Supervision of normal first pregnancy     OB History    Para Term  AB Living   1             SAB TAB Ectopic Multiple Live Births                  # Outcome Date GA Lbr Bhupinder/2nd Weight Sex Delivery Anes PTL Lv   1 Current                   Dating reviewed    Allergies and problem list reviewed and updated    Medical and surgical history reviewed    Prenatal labs reviewed and updated    Physical Exam:  ABD: soft, gravid, nontender,     Assessment:  Diagnoses and all orders for this visit:    Encounter for supervision of normal first pregnancy in third trimester    Other orders  -     ondansetron (ZOFRAN-ODT) 4 MG TbDL; Take 2 tablets (8 mg total) by mouth every 8 (eight) hours.         Plan:   +finklestein's test.  Exercises for Dequervain's given   follow up 2 Weeks, kick counts, labor precautions

## 2017-06-29 NOTE — LETTER
June 29, 2017    Tere Peña  [address] [place] [address]             [facility]  [address] [facility] [address]  Phone: [phone] To whom it may concern:    I am seeing Tere Peña for her pregnancy.  Estimated Date of Delivery: 8/26/17. She would like her mother, Zak Chavira, to be present for delivery and to help postpartum.      If you have any questions or concerns, please don't hesitate to call.  The number is [phone].    Maya Falcon MD

## 2017-07-03 ENCOUNTER — PATIENT MESSAGE (OUTPATIENT)
Dept: OBSTETRICS AND GYNECOLOGY | Facility: CLINIC | Age: 32
End: 2017-07-03

## 2017-07-11 ENCOUNTER — PATIENT MESSAGE (OUTPATIENT)
Dept: OBSTETRICS AND GYNECOLOGY | Facility: CLINIC | Age: 32
End: 2017-07-11

## 2017-07-20 ENCOUNTER — ROUTINE PRENATAL (OUTPATIENT)
Dept: OBSTETRICS AND GYNECOLOGY | Facility: CLINIC | Age: 32
End: 2017-07-20
Payer: MEDICAID

## 2017-07-20 ENCOUNTER — LAB VISIT (OUTPATIENT)
Dept: LAB | Facility: HOSPITAL | Age: 32
End: 2017-07-20
Attending: OBSTETRICS & GYNECOLOGY
Payer: MEDICAID

## 2017-07-20 VITALS — WEIGHT: 168 LBS | SYSTOLIC BLOOD PRESSURE: 117 MMHG | DIASTOLIC BLOOD PRESSURE: 73 MMHG

## 2017-07-20 DIAGNOSIS — Z34.03 ENCOUNTER FOR SUPERVISION OF NORMAL FIRST PREGNANCY IN THIRD TRIMESTER: ICD-10-CM

## 2017-07-20 DIAGNOSIS — Z34.03 ENCOUNTER FOR SUPERVISION OF NORMAL FIRST PREGNANCY IN THIRD TRIMESTER: Primary | ICD-10-CM

## 2017-07-20 LAB
BASOPHILS # BLD AUTO: ABNORMAL K/UL
BASOPHILS NFR BLD: 0 %
DIFFERENTIAL METHOD: ABNORMAL
EOSINOPHIL # BLD AUTO: ABNORMAL K/UL
EOSINOPHIL NFR BLD: 0 %
ERYTHROCYTE [DISTWIDTH] IN BLOOD BY AUTOMATED COUNT: 13 %
HCT VFR BLD AUTO: 34.5 %
HGB BLD-MCNC: 11.4 G/DL
LYMPHOCYTES # BLD AUTO: ABNORMAL K/UL
LYMPHOCYTES NFR BLD: 9 %
MCH RBC QN AUTO: 30.2 PG
MCHC RBC AUTO-ENTMCNC: 33 G/DL
MCV RBC AUTO: 92 FL
METAMYELOCYTES NFR BLD MANUAL: 2 %
MONOCYTES # BLD AUTO: ABNORMAL K/UL
MONOCYTES NFR BLD: 4 %
NEUTROPHILS NFR BLD: 83 %
NEUTS BAND NFR BLD MANUAL: 2 %
PLATELET # BLD AUTO: 254 K/UL
PMV BLD AUTO: 10 FL
RBC # BLD AUTO: 3.77 M/UL
WBC # BLD AUTO: 10.96 K/UL

## 2017-07-20 PROCEDURE — 86592 SYPHILIS TEST NON-TREP QUAL: CPT

## 2017-07-20 PROCEDURE — 99213 OFFICE O/P EST LOW 20 MIN: CPT | Mod: TH,S$PBB,, | Performed by: OBSTETRICS & GYNECOLOGY

## 2017-07-20 PROCEDURE — 86703 HIV-1/HIV-2 1 RESULT ANTBDY: CPT

## 2017-07-20 PROCEDURE — 36415 COLL VENOUS BLD VENIPUNCTURE: CPT

## 2017-07-20 PROCEDURE — 99999 PR PBB SHADOW E&M-EST. PATIENT-LVL I: CPT | Mod: PBBFAC,,, | Performed by: OBSTETRICS & GYNECOLOGY

## 2017-07-20 PROCEDURE — 85007 BL SMEAR W/DIFF WBC COUNT: CPT

## 2017-07-20 PROCEDURE — 85027 COMPLETE CBC AUTOMATED: CPT

## 2017-07-20 NOTE — PROGRESS NOTES
Complaints today: none  Good fm.  Denies ctx, vb, lof.  She attended session 5 centering today.  We discussed goals, comfort in labor, breathing, medications for birth, labor continuum, and when to call/come in..    /73   Wt 76.2 kg (168 lb)   LMP  (LMP Unknown)     31 y.o., at 34w5d by Estimated Date of Delivery: 17  Patient Active Problem List   Diagnosis    Supervision of normal first pregnancy/breast/?cyndy Owens's tenosynovitis     OB History    Para Term  AB Living   1             SAB TAB Ectopic Multiple Live Births                  # Outcome Date GA Lbr Bhupinder/2nd Weight Sex Delivery Anes PTL Lv   1 Current                   Dating reviewed    Allergies and problem list reviewed and updated    Medical and surgical history reviewed    Prenatal labs reviewed and updated    Physical Exam:  ABD: soft, gravid, nontender,     Assessment:  Diagnoses and all orders for this visit:    Encounter for supervision of normal first pregnancy in third trimester  -     CBC auto differential; Future  -     HIV-1 and HIV-2 antibodies; Future  -     RPR; Future  -     BREAST PUMP FOR HOME USE         Plan:   gbs on rtc   follow up 2 Weeks, kick counts, labor precautions

## 2017-07-21 LAB
HIV 1+2 AB+HIV1 P24 AG SERPL QL IA: NEGATIVE
RPR SER QL: NORMAL

## 2017-08-03 ENCOUNTER — ROUTINE PRENATAL (OUTPATIENT)
Dept: OBSTETRICS AND GYNECOLOGY | Facility: CLINIC | Age: 32
End: 2017-08-03
Payer: MEDICAID

## 2017-08-03 VITALS — WEIGHT: 171 LBS | DIASTOLIC BLOOD PRESSURE: 61 MMHG | SYSTOLIC BLOOD PRESSURE: 100 MMHG

## 2017-08-03 DIAGNOSIS — Z34.03 ENCOUNTER FOR SUPERVISION OF NORMAL FIRST PREGNANCY IN THIRD TRIMESTER: Primary | ICD-10-CM

## 2017-08-03 DIAGNOSIS — M65.4 DE QUERVAIN'S TENOSYNOVITIS: ICD-10-CM

## 2017-08-03 PROCEDURE — 99213 OFFICE O/P EST LOW 20 MIN: CPT | Mod: PBBFAC,PO | Performed by: OBSTETRICS & GYNECOLOGY

## 2017-08-03 PROCEDURE — 99999 PR PBB SHADOW E&M-EST. PATIENT-LVL III: CPT | Mod: PBBFAC,,, | Performed by: OBSTETRICS & GYNECOLOGY

## 2017-08-03 PROCEDURE — 99213 OFFICE O/P EST LOW 20 MIN: CPT | Mod: TH,S$PBB,, | Performed by: OBSTETRICS & GYNECOLOGY

## 2017-08-03 PROCEDURE — 3008F BODY MASS INDEX DOCD: CPT | Mod: ,,, | Performed by: OBSTETRICS & GYNECOLOGY

## 2017-08-03 PROCEDURE — 87081 CULTURE SCREEN ONLY: CPT

## 2017-08-03 NOTE — PROGRESS NOTES
Complaints today: right wrist pain  Good fm.  Denies ctx, vb, lof.  She attended centering session 6 where we discussed labor and delivery..    /61   Wt 77.6 kg (171 lb)   LMP  (LMP Unknown)     31 y.o., at 36w5d by Estimated Date of Delivery: 17  Patient Active Problem List   Diagnosis    Supervision of normal first pregnancy/breast/?mirena    De Quervain's tenosynovitis     OB History    Para Term  AB Living   1             SAB TAB Ectopic Multiple Live Births                  # Outcome Date GA Lbr Bhupinder/2nd Weight Sex Delivery Anes PTL Lv   1 Current                   Dating reviewed    Allergies and problem list reviewed and updated    Medical and surgical history reviewed    Prenatal labs reviewed and updated    Physical Exam:  ABD: soft, gravid, nontender,     Assessment:  Diagnoses and all orders for this visit:    Encounter for supervision of normal first pregnancy in third trimester  -     Strep B Screen, Vaginal / Rectal    De Quervain's tenosynovitis  -     Ambulatory consult to Physical Therapy         Plan:   follow up gbs   follow up 1 Weeks, kick counts, labor precautions

## 2017-08-08 ENCOUNTER — ROUTINE PRENATAL (OUTPATIENT)
Dept: OBSTETRICS AND GYNECOLOGY | Facility: CLINIC | Age: 32
End: 2017-08-08
Payer: MEDICAID

## 2017-08-08 VITALS — DIASTOLIC BLOOD PRESSURE: 64 MMHG | SYSTOLIC BLOOD PRESSURE: 126 MMHG | WEIGHT: 172.63 LBS

## 2017-08-08 DIAGNOSIS — Z34.93 ENCOUNTER FOR SUPERVISION OF NORMAL PREGNANCY IN THIRD TRIMESTER, UNSPECIFIED GRAVIDITY: Primary | ICD-10-CM

## 2017-08-08 LAB — BACTERIA SPEC AEROBE CULT: NORMAL

## 2017-08-08 PROCEDURE — 99999 PR PBB SHADOW E&M-EST. PATIENT-LVL II: CPT | Mod: PBBFAC,,, | Performed by: ADVANCED PRACTICE MIDWIFE

## 2017-08-08 PROCEDURE — 99213 OFFICE O/P EST LOW 20 MIN: CPT | Mod: TH,S$PBB,, | Performed by: ADVANCED PRACTICE MIDWIFE

## 2017-08-08 PROCEDURE — 99212 OFFICE O/P EST SF 10 MIN: CPT | Mod: PBBFAC,PO | Performed by: ADVANCED PRACTICE MIDWIFE

## 2017-08-08 NOTE — PROGRESS NOTES
Complaints today: Denies regular ctx, vb, lof.  Patient is having occasional irregular contractions. Feels that fetal movement is decreased, however has not been specifically counting movements.  Patient has not had her appointment with PT for her tenosynovitis - pain has been manageable and patient will see PT.     /61   Wt 77.6 kg (171 lb)   LMP  (LMP Unknown)      31 y.o., at 36w5d by Estimated Date of Delivery: 17      Patient Active Problem List   Diagnosis    Supervision of normal first pregnancy/breast/?mirena    De Richard's tenosynovitis                       OB History    Para Term  AB Living   1             SAB TAB Ectopic Multiple Live Births                   # Outcome Date GA Lbr Bhupinder/2nd Weight Sex Delivery Anes PTL Lv   1 Current                               Dating reviewed     Allergies and problem list reviewed and updated     Medical and surgical history reviewed     Prenatal labs reviewed and updated     Physical Exam:  ABD: soft, gravid, nontender     Assessment:  Tere was seen today for routine prenatal visit.    Diagnoses and all orders for this visit:    Encounter for supervision of normal pregnancy in third trimester, unspecified       Plan:      Discussed fetal movement counts (10 times in 2 hours). Patient to conscientiously count fetal movements.  Discussed labor precautions, including LOF, vaginal bleeding, regular contractions. Counseled patient to present to L&D with any of these symptoms.  Follow up in 1 week     Sushma K Reddy, MD    Present throughout visit  Reviewed note and agree with plan.- MB

## 2017-08-10 ENCOUNTER — PATIENT MESSAGE (OUTPATIENT)
Dept: OBSTETRICS AND GYNECOLOGY | Facility: CLINIC | Age: 32
End: 2017-08-10

## 2017-08-11 NOTE — TELEPHONE ENCOUNTER
Pt sent my chart message requiring a Rx for Zofran. Pt stated that she never received printed copy of Rx. Called in Rx to [facility] in [facility]. Pt notified and voiced understanding.

## 2017-08-15 ENCOUNTER — ROUTINE PRENATAL (OUTPATIENT)
Dept: OBSTETRICS AND GYNECOLOGY | Facility: CLINIC | Age: 32
End: 2017-08-15
Payer: MEDICAID

## 2017-08-15 VITALS — SYSTOLIC BLOOD PRESSURE: 114 MMHG | WEIGHT: 175.5 LBS | DIASTOLIC BLOOD PRESSURE: 62 MMHG

## 2017-08-15 DIAGNOSIS — Z32.01 POSITIVE PREGNANCY TEST: Primary | ICD-10-CM

## 2017-08-15 PROCEDURE — 99213 OFFICE O/P EST LOW 20 MIN: CPT | Mod: TH,S$PBB,, | Performed by: OBSTETRICS & GYNECOLOGY

## 2017-08-15 PROCEDURE — 99999 PR PBB SHADOW E&M-EST. PATIENT-LVL III: CPT | Mod: PBBFAC,,, | Performed by: OBSTETRICS & GYNECOLOGY

## 2017-08-15 PROCEDURE — 99213 OFFICE O/P EST LOW 20 MIN: CPT | Mod: PBBFAC,PO | Performed by: OBSTETRICS & GYNECOLOGY

## 2017-08-15 PROCEDURE — 3008F BODY MASS INDEX DOCD: CPT | Mod: ,,, | Performed by: OBSTETRICS & GYNECOLOGY

## 2017-08-15 NOTE — PROGRESS NOTES
Complaints today: no complaints. Occasional contractions, no bleeding or LOF, good FM.     /62   Wt 79.6 kg (175 lb 7.8 oz)   LMP  (LMP Unknown)     31 y.o., at 38w3d by Estimated Date of Delivery: 17  Patient Active Problem List   Diagnosis    Supervision of normal first pregnancy/breast/?mirena    Carlos Ramos's tenosynovitis     OB History    Para Term  AB Living   1             SAB TAB Ectopic Multiple Live Births                  # Outcome Date GA Lbr Bhupinder/2nd Weight Sex Delivery Anes PTL Lv   1 Current                 Dating reviewed  Allergies and problem list reviewed and updated  Medical and surgical history reviewed  Prenatal labs reviewed and updated    Physical Exam:  ABD: soft, gravid, nontender, size appropriate for dates  Cervix 2/60/-2, soft, anterior    Assessment:  IUP @ 38.3, no issues, no s/s labor    Plan:    RTC 1 week  kick counts, labor precautions, [place] discussed

## 2017-08-21 ENCOUNTER — HOSPITAL ENCOUNTER (EMERGENCY)
Facility: OTHER | Age: 32
Discharge: HOME | End: 2017-08-21
Payer: MEDICAID

## 2017-08-21 VITALS
SYSTOLIC BLOOD PRESSURE: 119 MMHG | HEART RATE: 94 BPM | DIASTOLIC BLOOD PRESSURE: 78 MMHG | RESPIRATION RATE: 18 BRPM | TEMPERATURE: 97 F

## 2017-08-21 DIAGNOSIS — O36.8120 DECREASED FETAL MOVEMENT, SECOND TRIMESTER, NOT APPLICABLE OR UNSPECIFIED FETUS: Primary | ICD-10-CM

## 2017-08-21 DIAGNOSIS — Z3A.39 39 WEEKS GESTATION OF PREGNANCY: ICD-10-CM

## 2017-08-21 DIAGNOSIS — Z36.89 NST (NON-STRESS TEST) REACTIVE: ICD-10-CM

## 2017-08-21 LAB
BILIRUB SERPL-MCNC: NORMAL MG/DL
BLOOD URINE, POC: NORMAL
COLOR, POC UA: YELLOW
GLUCOSE UR QL STRIP: NORMAL
KETONES UR QL STRIP: NORMAL
LEUKOCYTE ESTERASE URINE, POC: NORMAL
NITRITE, POC UA: NORMAL
PH, POC UA: NORMAL
PROTEIN, POC: NORMAL
SPECIFIC GRAVITY, POC UA: NORMAL
UROBILINOGEN, POC UA: NORMAL

## 2017-08-21 PROCEDURE — 99284 EMERGENCY DEPT VISIT MOD MDM: CPT | Mod: 25

## 2017-08-21 PROCEDURE — 76815 OB US LIMITED FETUS(S): CPT

## 2017-08-21 PROCEDURE — 59025 FETAL NON-STRESS TEST: CPT | Mod: 26,,, | Performed by: OBSTETRICS & GYNECOLOGY

## 2017-08-21 PROCEDURE — 81002 URINALYSIS NONAUTO W/O SCOPE: CPT

## 2017-08-21 PROCEDURE — 99283 EMERGENCY DEPT VISIT LOW MDM: CPT | Mod: 25,,, | Performed by: OBSTETRICS & GYNECOLOGY

## 2017-08-21 PROCEDURE — 59025 FETAL NON-STRESS TEST: CPT

## 2017-08-21 NOTE — ED PROVIDER NOTES
Encounter Date: 2017       History     Chief Complaint   Patient presents with    Decreased Fetal Movement     Tere Peña is a 32 y.o. F at 39w2d presents complaining of decreased FM. Baby usually moves quite frequently but she noticed an abrupt decrease in movement yesterday since noon. She laid down, ate something, yet still did not note movement. She was seen at [facility] ED where they verified FHTs, but told her to come into [facility] for further monitoring. She has since noticed fetal movement, and continues to note it here in [facility].  This IUP is complicated by PCOS.  Patient reports [place] contractions, denies vaginal bleeding, and is unsure of LOF. States she never experienced a big gush, but at times it is hard for her to tell if she urinated on herself or not.  Fetal Movement: decreased -> normal.            Review of patient's allergies indicates:  No Known Allergies  Past Medical History:   Diagnosis Date    PCOS (polycystic ovarian syndrome)      No past surgical history on file.  Family History   Problem Relation Age of Onset    Breast cancer Neg Hx     Colon cancer Neg Hx     Ovarian cancer Neg Hx      Social History   Substance Use Topics    Smoking status: Never Smoker    Smokeless tobacco: Never Used    Alcohol use No     Review of Systems   Constitutional: Negative for fever.   Eyes: Negative for visual disturbance.   Respiratory: Negative for shortness of breath.    Cardiovascular: Negative for chest pain.   Gastrointestinal: Negative for nausea.   Genitourinary: Negative for dysuria and vaginal bleeding.   Musculoskeletal: Negative for back pain.   Skin: Negative for rash.   Neurological: Negative for weakness and headaches.   Hematological: Does not bruise/bleed easily.       Physical Exam     Initial Vitals   BP Pulse Resp Temp SpO2   17 0332 17 0332 17 0332 17 0335 --   119/78 98 18 97.2 °F (36.2 °C)       MAP       17 033       91.67          Physical Exam    Constitutional: She appears well-developed and well-nourished. No distress.   HENT:   Head: Normocephalic and atraumatic.   Cardiovascular: Normal rate and regular rhythm.   Pulmonary/Chest: No respiratory distress.   Neurological: She is alert and oriented to person, place, and time.   Psychiatric: She has a normal mood and affect.     OB LABOR EXAM:     Membranes ruptured: No.   Method: Sterile speculum exam per MD and Sterile vaginal exam per MD.   Vaginal Bleeding: none present.     Dilatation: 2.   Station: -3.   Effacement: 60%.       Comments: SSE: no pooling, no trickling with valsalva, nitrazine negative       ED Course   Obtain Fetal nonstress test (NST)  Date/Time: 8/21/2017 4:12 AM  Performed by: NINOSKA EVANGELISTA  Authorized by: NINOSKA EVANGELISTA     A time out verifies correct patient, procedure, equipment, support staff and site/side marked as required:   Nonstress Test:     Variability:  6-25 BPM    Decelerations:  None    Accelerations:  15 bpm    Baseline:  140    Uterine Irritability: No      Contractions:  Not present  US - ED Performed - OB Limited 1 or More Gestations  Date/Time: 8/21/2017 4:13 AM  Performed by: NINOSKA EVANGELISTA  Authorized by: NINOSKA EVANGELISTA   Comments: Cephalic presentation  MVP >6cm  Gross fetal movement noted        Labs Reviewed   POCT URINALYSIS, DIPSTICK OR TABLET REAGENT, AUTOMATED, WITH MICROSCOP                   APC / Resident Notes:   Decreased fetal movement throughout afternoon, and into evening--> endorses FM here in [facility]  Fluid adequate around fetus  NST reactive and reassuring  Patient unsure about LOF, SSE not concerning for ROM  Cervix unchanged from clinic, 2/60/-3         Attending Attestation:   Physician Attestation Statement for Resident:  As the supervising MD   Physician Attestation Statement: I have personally seen and examined this patient.   I agree with the above history. -:   As the supervising MD I  agree with the above PE.    As the supervising MD I agree with the above treatment, course, plan, and disposition.   -:   NST  I independently reviewed the fetal non-stress test with the following interpretation:  140 BPM baseline  Variability: moderate  Accelerations: present  Decelerations: absent  Contractions: none  Category 1    Clinical Interpretation: reactive    Patient evaluated and found to be stable, agree with resident's assessment of decreased fetal movement now resolved and plan to discharge after discussion re movement counts and s/s labor.  I was personally present during the critical portions of the procedure(s) performed by the resident and was immediately available in the ED to provide services and assistance as needed during the entire procedure.                    ED Course     Clinical Impression:   The primary encounter diagnosis was Decreased fetal movement, second trimester, not applicable or unspecified fetus. Diagnoses of NST (non-stress test) reactive and 39 weeks gestation of pregnancy were also pertinent to this visit.                           Deja Salazar MD  Resident  08/21/17 0419       Kylie Palmer MD  08/21/17 [id number]

## 2017-08-24 ENCOUNTER — ROUTINE PRENATAL (OUTPATIENT)
Dept: OBSTETRICS AND GYNECOLOGY | Facility: CLINIC | Age: 32
End: 2017-08-24
Payer: MEDICAID

## 2017-08-24 VITALS — SYSTOLIC BLOOD PRESSURE: 114 MMHG | WEIGHT: 173 LBS | DIASTOLIC BLOOD PRESSURE: 79 MMHG

## 2017-08-24 DIAGNOSIS — Z34.03 ENCOUNTER FOR SUPERVISION OF NORMAL FIRST PREGNANCY IN THIRD TRIMESTER: Primary | ICD-10-CM

## 2017-08-24 PROCEDURE — 99213 OFFICE O/P EST LOW 20 MIN: CPT | Mod: TH,S$PBB,, | Performed by: OBSTETRICS & GYNECOLOGY

## 2017-08-24 PROCEDURE — 3008F BODY MASS INDEX DOCD: CPT | Mod: ,,, | Performed by: OBSTETRICS & GYNECOLOGY

## 2017-08-24 PROCEDURE — 99999 PR PBB SHADOW E&M-EST. PATIENT-LVL I: CPT | Mod: PBBFAC,,, | Performed by: OBSTETRICS & GYNECOLOGY

## 2017-08-24 PROCEDURE — 99211 OFF/OP EST MAY X REQ PHY/QHP: CPT | Mod: PBBFAC,PO | Performed by: OBSTETRICS & GYNECOLOGY

## 2017-08-24 NOTE — PROGRESS NOTES
Complaints today: ctx  Good fm.  Denies vb, lof.    /79   Wt 78.5 kg (173 lb)   LMP  (LMP Unknown)     32 y.o., at 39w5d by Estimated Date of Delivery: 17  Patient Active Problem List   Diagnosis    Supervision of normal first pregnancy/breast/?mirena    De Richard's tenosynovitis     OB History    Para Term  AB Living   1             SAB TAB Ectopic Multiple Live Births                  # Outcome Date GA Lbr Bhupinder/2nd Weight Sex Delivery Anes PTL Lv   1 Current                   Dating reviewed    Allergies and problem list reviewed and updated    Medical and surgical history reviewed    Prenatal labs reviewed and updated    Physical Exam:  ABD: soft, gravid, nontender,     Assessment:  Diagnoses and all orders for this visit:    Encounter for supervision of normal first pregnancy in third trimester         Plan:   Induction on  at 8:15pm   follow up Weeks, kick counts, labor precautions

## 2017-08-25 ENCOUNTER — ANESTHESIA (OUTPATIENT)
Dept: OBSTETRICS AND GYNECOLOGY | Facility: OTHER | Age: 32
End: 2017-08-25
Payer: MEDICAID

## 2017-08-25 ENCOUNTER — HOSPITAL ENCOUNTER (INPATIENT)
Facility: OTHER | Age: 32
LOS: 4 days | Discharge: HOME | End: 2017-08-29
Attending: OBSTETRICS & GYNECOLOGY | Admitting: OBSTETRICS & GYNECOLOGY
Payer: MEDICAID

## 2017-08-25 ENCOUNTER — ANESTHESIA EVENT (OUTPATIENT)
Dept: OBSTETRICS AND GYNECOLOGY | Facility: OTHER | Age: 32
End: 2017-08-25
Payer: MEDICAID

## 2017-08-25 ENCOUNTER — PATIENT MESSAGE (OUTPATIENT)
Dept: OBSTETRICS AND GYNECOLOGY | Facility: CLINIC | Age: 32
End: 2017-08-25

## 2017-08-25 DIAGNOSIS — Z3A.39 39 WEEKS GESTATION OF PREGNANCY: ICD-10-CM

## 2017-08-25 LAB
ABO + RH BLD: NORMAL
BASOPHILS # BLD AUTO: 0 K/UL
BASOPHILS NFR BLD: 0 %
BLD GP AB SCN CELLS X3 SERPL QL: NORMAL
DIFFERENTIAL METHOD: ABNORMAL
EOSINOPHIL # BLD AUTO: 0 K/UL
EOSINOPHIL NFR BLD: 0 %
ERYTHROCYTE [DISTWIDTH] IN BLOOD BY AUTOMATED COUNT: 13.3 %
HCT VFR BLD AUTO: 38.4 %
HGB BLD-MCNC: 12.9 G/DL
LYMPHOCYTES # BLD AUTO: 1.1 K/UL
LYMPHOCYTES NFR BLD: 7 %
MCH RBC QN AUTO: 29.4 PG
MCHC RBC AUTO-ENTMCNC: 33.6 G/DL
MCV RBC AUTO: 88 FL
MONOCYTES # BLD AUTO: 0.6 K/UL
MONOCYTES NFR BLD: 3.7 %
NEUTROPHILS # BLD AUTO: 13.4 K/UL
NEUTROPHILS NFR BLD: 88.8 %
PLATELET # BLD AUTO: 267 K/UL
PMV BLD AUTO: 10.2 FL
RBC # BLD AUTO: 4.39 M/UL
WBC # BLD AUTO: 15.12 K/UL

## 2017-08-25 PROCEDURE — 86901 BLOOD TYPING SEROLOGIC RH(D): CPT

## 2017-08-25 PROCEDURE — 25000003 PHARM REV CODE 250: Performed by: ADVANCED PRACTICE MIDWIFE

## 2017-08-25 PROCEDURE — 85025 COMPLETE CBC W/AUTO DIFF WBC: CPT

## 2017-08-25 PROCEDURE — 63600175 PHARM REV CODE 636 W HCPCS: Performed by: STUDENT IN AN ORGANIZED HEALTH CARE EDUCATION/TRAINING PROGRAM

## 2017-08-25 PROCEDURE — 59025 FETAL NON-STRESS TEST: CPT | Mod: 26,,, | Performed by: OBSTETRICS & GYNECOLOGY

## 2017-08-25 PROCEDURE — 51702 INSERT TEMP BLADDER CATH: CPT

## 2017-08-25 PROCEDURE — 25000003 PHARM REV CODE 250: Performed by: ANESTHESIOLOGY

## 2017-08-25 PROCEDURE — 10907ZC DRAINAGE OF AMNIOTIC FLUID, THERAPEUTIC FROM PRODUCTS OF CONCEPTION, VIA NATURAL OR ARTIFICIAL OPENING: ICD-10-PCS | Performed by: OBSTETRICS & GYNECOLOGY

## 2017-08-25 PROCEDURE — 72100002 HC LABOR CARE, 1ST 8 HOURS

## 2017-08-25 PROCEDURE — 25000003 PHARM REV CODE 250: Performed by: STUDENT IN AN ORGANIZED HEALTH CARE EDUCATION/TRAINING PROGRAM

## 2017-08-25 PROCEDURE — 72100003 HC LABOR CARE, EA. ADDL. 8 HRS

## 2017-08-25 PROCEDURE — 27000181 HC CABLE, IUPC

## 2017-08-25 PROCEDURE — 27200710 HC EPIDURAL INFUSION PUMP SET: Performed by: ANESTHESIOLOGY

## 2017-08-25 PROCEDURE — 59025 FETAL NON-STRESS TEST: CPT

## 2017-08-25 PROCEDURE — 99283 EMERGENCY DEPT VISIT LOW MDM: CPT | Mod: 25,,, | Performed by: OBSTETRICS & GYNECOLOGY

## 2017-08-25 PROCEDURE — 86900 BLOOD TYPING SEROLOGIC ABO: CPT

## 2017-08-25 PROCEDURE — 3E033VJ INTRODUCTION OF OTHER HORMONE INTO PERIPHERAL VEIN, PERCUTANEOUS APPROACH: ICD-10-PCS | Performed by: OBSTETRICS & GYNECOLOGY

## 2017-08-25 PROCEDURE — 96372 THER/PROPH/DIAG INJ SC/IM: CPT

## 2017-08-25 PROCEDURE — 25000003 PHARM REV CODE 250: Performed by: OBSTETRICS & GYNECOLOGY

## 2017-08-25 PROCEDURE — 99285 EMERGENCY DEPT VISIT HI MDM: CPT | Mod: 25

## 2017-08-25 PROCEDURE — 63600175 PHARM REV CODE 636 W HCPCS: Performed by: ANESTHESIOLOGY

## 2017-08-25 PROCEDURE — 11000001 HC ACUTE MED/SURG PRIVATE ROOM

## 2017-08-25 PROCEDURE — 62326 NJX INTERLAMINAR LMBR/SAC: CPT | Performed by: ANESTHESIOLOGY

## 2017-08-25 PROCEDURE — 27800517 HC TRAY,EPIDURAL-CONTINUOUS: Performed by: ANESTHESIOLOGY

## 2017-08-25 RX ORDER — SODIUM CHLORIDE, SODIUM LACTATE, POTASSIUM CHLORIDE, CALCIUM CHLORIDE 600; 310; 30; 20 MG/100ML; MG/100ML; MG/100ML; MG/100ML
INJECTION, SOLUTION INTRAVENOUS CONTINUOUS
Status: DISCONTINUED | OUTPATIENT
Start: 2017-08-25 | End: 2017-08-26

## 2017-08-25 RX ORDER — BUPIVACAINE HYDROCHLORIDE 2.5 MG/ML
INJECTION, SOLUTION EPIDURAL; INFILTRATION; INTRACAUDAL
Status: DISPENSED
Start: 2017-08-25 | End: 2017-08-26

## 2017-08-25 RX ORDER — MISOPROSTOL 200 UG/1
600 TABLET ORAL
Status: DISCONTINUED | OUTPATIENT
Start: 2017-08-25 | End: 2017-08-26

## 2017-08-25 RX ORDER — PROMETHAZINE HYDROCHLORIDE 25 MG/1
25 TABLET ORAL ONCE
Status: COMPLETED | OUTPATIENT
Start: 2017-08-25 | End: 2017-08-25

## 2017-08-25 RX ORDER — FENTANYL/BUPIVACAINE/NS/PF 2MCG/ML-.1
PLASTIC BAG, INJECTION (ML) INJECTION
Status: DISPENSED
Start: 2017-08-25 | End: 2017-08-25

## 2017-08-25 RX ORDER — FENTANYL/BUPIVACAINE/NS/PF 2MCG/ML-.1
PLASTIC BAG, INJECTION (ML) INJECTION CONTINUOUS PRN
Status: DISCONTINUED | OUTPATIENT
Start: 2017-08-25 | End: 2017-08-26

## 2017-08-25 RX ORDER — OXYTOCIN/RINGER'S LACTATE 20/1000 ML
20 PLASTIC BAG, INJECTION (ML) INTRAVENOUS ONCE
Status: DISCONTINUED | OUTPATIENT
Start: 2017-08-25 | End: 2017-08-26

## 2017-08-25 RX ORDER — BUTORPHANOL TARTRATE 1 MG/ML
1 INJECTION INTRAMUSCULAR; INTRAVENOUS
Status: DISCONTINUED | OUTPATIENT
Start: 2017-08-25 | End: 2017-08-26

## 2017-08-25 RX ORDER — BUTORPHANOL TARTRATE 1 MG/ML
2 INJECTION INTRAMUSCULAR; INTRAVENOUS
Status: DISCONTINUED | OUTPATIENT
Start: 2017-08-25 | End: 2017-08-26

## 2017-08-25 RX ORDER — SODIUM CITRATE AND CITRIC ACID MONOHYDRATE 334; 500 MG/5ML; MG/5ML
30 SOLUTION ORAL ONCE
Status: COMPLETED | OUTPATIENT
Start: 2017-08-25 | End: 2017-08-26

## 2017-08-25 RX ORDER — FENTANYL/BUPIVACAINE/NS/PF 2MCG/ML-.1
PLASTIC BAG, INJECTION (ML) INJECTION CONTINUOUS
Status: DISCONTINUED | OUTPATIENT
Start: 2017-08-25 | End: 2017-08-26

## 2017-08-25 RX ORDER — CARBOPROST TROMETHAMINE 250 UG/ML
250 INJECTION, SOLUTION INTRAMUSCULAR
Status: DISCONTINUED | OUTPATIENT
Start: 2017-08-25 | End: 2017-08-26

## 2017-08-25 RX ORDER — LIDOCAINE HYDROCHLORIDE AND EPINEPHRINE 15; 5 MG/ML; UG/ML
INJECTION, SOLUTION EPIDURAL
Status: DISCONTINUED | OUTPATIENT
Start: 2017-08-25 | End: 2017-08-26

## 2017-08-25 RX ORDER — FENTANYL CITRATE 50 UG/ML
INJECTION, SOLUTION INTRAMUSCULAR; INTRAVENOUS
Status: DISCONTINUED | OUTPATIENT
Start: 2017-08-25 | End: 2017-08-26

## 2017-08-25 RX ORDER — BUTORPHANOL TARTRATE 1 MG/ML
2 INJECTION INTRAMUSCULAR; INTRAVENOUS ONCE
Status: COMPLETED | OUTPATIENT
Start: 2017-08-25 | End: 2017-08-25

## 2017-08-25 RX ORDER — BUPIVACAINE HYDROCHLORIDE 2.5 MG/ML
INJECTION, SOLUTION EPIDURAL; INFILTRATION; INTRACAUDAL
Status: DISCONTINUED | OUTPATIENT
Start: 2017-08-25 | End: 2017-08-26

## 2017-08-25 RX ORDER — ONDANSETRON 8 MG/1
8 TABLET, ORALLY DISINTEGRATING ORAL EVERY 8 HOURS PRN
Status: DISCONTINUED | OUTPATIENT
Start: 2017-08-25 | End: 2017-08-26

## 2017-08-25 RX ORDER — OXYTOCIN/RINGER'S LACTATE 20/1000 ML
2 PLASTIC BAG, INJECTION (ML) INTRAVENOUS CONTINUOUS
Status: DISCONTINUED | OUTPATIENT
Start: 2017-08-25 | End: 2017-08-26

## 2017-08-25 RX ORDER — FAMOTIDINE 10 MG/ML
20 INJECTION INTRAVENOUS ONCE
Status: COMPLETED | OUTPATIENT
Start: 2017-08-25 | End: 2017-08-26

## 2017-08-25 RX ORDER — FENTANYL CITRATE 50 UG/ML
INJECTION, SOLUTION INTRAMUSCULAR; INTRAVENOUS
Status: DISPENSED
Start: 2017-08-25 | End: 2017-08-25

## 2017-08-25 RX ORDER — OXYTOCIN/RINGER'S LACTATE 20/1000 ML
41.65 PLASTIC BAG, INJECTION (ML) INTRAVENOUS CONTINUOUS
Status: ACTIVE | OUTPATIENT
Start: 2017-08-25 | End: 2017-08-25

## 2017-08-25 RX ORDER — METHYLERGONOVINE MALEATE 0.2 MG/ML
200 INJECTION INTRAVENOUS
Status: DISCONTINUED | OUTPATIENT
Start: 2017-08-25 | End: 2017-08-26

## 2017-08-25 RX ORDER — BUPIVACAINE HYDROCHLORIDE 2.5 MG/ML
INJECTION, SOLUTION EPIDURAL; INFILTRATION; INTRACAUDAL
Status: DISPENSED
Start: 2017-08-25 | End: 2017-08-25

## 2017-08-25 RX ADMIN — ONDANSETRON 8 MG: 8 TABLET, ORALLY DISINTEGRATING ORAL at 10:08

## 2017-08-25 RX ADMIN — Medication 10 ML/HR: at 09:08

## 2017-08-25 RX ADMIN — LIDOCAINE HYDROCHLORIDE,EPINEPHRINE BITARTRATE 3 ML: 15; .005 INJECTION, SOLUTION EPIDURAL; INFILTRATION; INTRACAUDAL; PERINEURAL at 09:08

## 2017-08-25 RX ADMIN — PROMETHAZINE HYDROCHLORIDE 25 MG: 25 TABLET ORAL at 06:08

## 2017-08-25 RX ADMIN — Medication 5 ML: at 09:08

## 2017-08-25 RX ADMIN — SODIUM CHLORIDE, SODIUM LACTATE, POTASSIUM CHLORIDE, AND CALCIUM CHLORIDE: .6; .31; .03; .02 INJECTION, SOLUTION INTRAVENOUS at 07:08

## 2017-08-25 RX ADMIN — BUPIVACAINE HYDROCHLORIDE 8 ML: 2.5 INJECTION, SOLUTION EPIDURAL; INFILTRATION; INTRACAUDAL; PERINEURAL at 06:08

## 2017-08-25 RX ADMIN — Medication 2 MILLI-UNITS/MIN: at 11:08

## 2017-08-25 RX ADMIN — FENTANYL CITRATE 100 MCG: 50 INJECTION, SOLUTION INTRAMUSCULAR; INTRAVENOUS at 09:08

## 2017-08-25 RX ADMIN — SODIUM CHLORIDE, SODIUM LACTATE, POTASSIUM CHLORIDE, AND CALCIUM CHLORIDE: .6; .31; .03; .02 INJECTION, SOLUTION INTRAVENOUS at 09:08

## 2017-08-25 RX ADMIN — BUTORPHANOL TARTRATE 2 MG: 1 INJECTION, SOLUTION INTRAMUSCULAR; INTRAVENOUS at 06:08

## 2017-08-25 RX ADMIN — BUPIVACAINE HYDROCHLORIDE 5 ML: 2.5 INJECTION, SOLUTION EPIDURAL; INFILTRATION; INTRACAUDAL; PERINEURAL at 07:08

## 2017-08-25 RX ADMIN — SODIUM CHLORIDE, SODIUM LACTATE, POTASSIUM CHLORIDE, AND CALCIUM CHLORIDE 1000 ML: .6; .31; .03; .02 INJECTION, SOLUTION INTRAVENOUS at 09:08

## 2017-08-25 NOTE — ASSESSMENT & PLAN NOTE
- Consents signed and to chart  - Admit to Labor and Delivery unit  - Plan for active labor management    - Epidural per Anesthesia  - Draw CBC, T&S  - Notify Staff  - Ultrasound performed, fetus in vertex position.  - Recheck in 2 hrs or PRN  - Post-Partum Hemorrhage risk - low

## 2017-08-25 NOTE — PROGRESS NOTES
"LABOR NOTE    S:  Complaints: No.  Epidural working:  yes      O: /81   Pulse 97   Temp 97.6 °F (36.4 °C)   Resp 18   Ht 5' 4" (1.626 m)   Wt 78.5 kg (173 lb 1 oz)   LMP  (LMP Unknown)   SpO2 97%   Breastfeeding? No   BMI 29.71 kg/m²       FHT: 140s Cat 1 (reassuring); moderate variability, +accelerations, - decelerations,  CTX: q 2-3 minutes, pit at 20, MVUS 200s  SVE: 5/80/-2, same as last check      ASSESSMENT:   32 y.o.  at 39w6d, labor    FHT reassuring    Active Hospital Problems    Diagnosis  POA    *Indication for care in labor and delivery, antepartum [O75.9]  Yes      Resolved Hospital Problems    Diagnosis Date Resolved POA   No resolved problems to display.         PLAN:    Continue Close Maternal/Fetal Monitoring  Pitocin Augmentation per protocol  Recheck 2 hours or PRN  IUPC - keep MvUs adequate    Radha Camilo M.D.  PGY-4 OB/GYN    "

## 2017-08-25 NOTE — HPI
Tere Peña is a 32 y.o. F at 39w6d presents complaining of contractions q3-5 minutes. She was 1-2 cm dilated in clinic today. She also feels that she has been leaking but denies big gush of fluid. She was not found to be ruptured in [place], but cervix changed from 2 to 4 cm.  This IUP is uncomplicated.  Patient reports contractions, denies vaginal bleeding, reports LOF. Fetal Movement: normal.

## 2017-08-25 NOTE — SUBJECTIVE & OBJECTIVE
Obstetric History       T0      L0     SAB0   TAB0   Ectopic0   Multiple0   Live Births0       # Outcome Date GA Lbr Bhupinder/2nd Weight Sex Delivery Anes PTL Lv   1 Current                 Past Medical History:   Diagnosis Date    PCOS (polycystic ovarian syndrome)      No past surgical history on file.    PTA Medications   Medication Sig    ondansetron (ZOFRAN-ODT) 4 MG TbDL Take 2 tablets (8 mg total) by mouth every 8 (eight) hours.    PNV59-iron,carb,fum-FA-DSS-dha (CITRANATAL HARMONY, IRON FUM,) 27 mg iron-1 mg -50 mg-260 mg Cap Take 1 capsule by mouth once daily.       Review of patient's allergies indicates:  No Known Allergies     Family History     None        Social History Main Topics    Smoking status: Never Smoker    Smokeless tobacco: Never Used    Alcohol use No    Drug use: No    Sexual activity: Yes     Review of Systems   Constitutional: Negative for activity change, appetite change, fatigue and fever.   Respiratory: Negative for cough and shortness of breath.    Cardiovascular: Negative for chest pain and palpitations.   Gastrointestinal: Positive for abdominal pain. Negative for constipation, diarrhea, nausea and vomiting.   Genitourinary: Negative for dysuria, frequency, pelvic pain, vaginal bleeding and vaginal discharge.   Neurological: Negative for headaches.   Psychiatric/Behavioral: Negative for depression. The patient is not nervous/anxious.    Breast: Negative for breast mass and breast pain     Objective:     Vital Signs (Most Recent):  Temp: 97.9 °F (36.6 °C) (17 0709)  Pulse: 109 (17 1017)  Resp: 18 (17 0919)  BP: 117/67 (17 1016)  SpO2: 95 % (17 1016) Vital Signs (24h Range):  Temp:  [97.4 °F (36.3 °C)-97.9 °F (36.6 °C)] 97.9 °F (36.6 °C)  Pulse:  [] 109  Resp:  [16-18] 18  SpO2:  [92 %-98 %] 95 %  BP: (114-151)/(67-92) 117/67        There is no height or weight on file to calculate BMI.    FHT: 130bpm Cat 2 (reassuring), mod  BTBV, +accels  TOCO: Q 2-3 minutes    Physical Exam:   Constitutional: She is oriented to person, place, and time. She appears well-developed and well-nourished.    HENT:   Head: Normocephalic and atraumatic.     Neck: Normal range of motion.    Cardiovascular: Normal rate, regular rhythm and normal heart sounds.     Pulmonary/Chest: Effort normal and breath sounds normal.        Abdominal: Soft. Bowel sounds are normal. She exhibits no distension. There is no tenderness.   Gravid 39w             Musculoskeletal: Normal range of motion and moves all extremeties.       Neurological: She is alert and oriented to person, place, and time.    Skin: Skin is warm and dry.    Psychiatric: She has a normal mood and affect.       Cervix:  Dilation:  4  Effacement:  75%  Station: -2  Presentation: Vertex     Significant Labs:  Lab Results   Component Value Date    GROUPTRH B POS 08/25/2017    HEPBSAG Negative 01/03/2017    STREPBCULT No Group B Streptococcus isolated 08/03/2017       I have personallly reviewed all pertinent lab results from the last 24 hours.

## 2017-08-25 NOTE — ANESTHESIA PROCEDURE NOTES
Epidural    Patient location during procedure: OB   Reason for block: primary anesthetic   Diagnosis: Labor   Start time: 8/25/2017 9:38 AM  Timeout: 8/25/2017 9:35 AM  End time: 8/25/2017 9:45 AM  Staffing  Anesthesiologist: GWENDOLYN ROCHE  Resident/CRNA: SANDEEP KYLE  Performed: resident/CRNA   Preanesthetic Checklist  Completed: patient identified, site marked, surgical consent, pre-op evaluation, timeout performed, IV checked, risks and benefits discussed, monitors and equipment checked, anesthesia consent given, hand hygiene performed and patient being monitored  Preparation  Patient position: sitting  Prep: ChloraPrep  Patient monitoring: Pulse Ox and Blood Pressure  Epidural  Skin Anesthetic: lidocaine 1%  Administration type: single shot  Approach: midline  Interspace: L3-4  Injection technique: TOSHIA saline  Needle and Epidural Catheter  Needle type: Tuohy   Needle gauge: 17  Needle length: 3.5 inches  Needle insertion depth: 4 cm  Catheter type: springwound and multi-orifice  Catheter size: 19 G  Catheter at skin depth: 9 cm  Test dose: 3 mL of lidocaine 1.5% with Epi 1-to-200,000  Additional Documentation: incremental injection, negative aspiration for heme and CSF, no paresthesia on injection, no signs/symptoms of IV or SA injection, no significant pain on injection and no significant complaints from patient  Needle localization: anatomical landmarks  Medications:  Bolus administered: 10 mL of 0.125% bupivacaine  Opioid administered: 100 mcg of   fentanyl  Volume per aspiration: 5 mL  Time between aspirations: 5 minutes  Assessment  Upper dermatomal levels - Left: T10  Right: T10   Dermatomal levels determined by alcohol wipe  Ease of block: easy  Patient's tolerance of the procedure: comfortable throughout block and no complaints  Additional Notes  Dural puncture with +ve CSF return performed. Catheter threaded easily.

## 2017-08-25 NOTE — PROGRESS NOTES
"LABOR NOTE    S:  Complaints: No.  Epidural working:  yes  MD to bedside for cervical check     O: /62   Pulse 96   Temp 96.7 °F (35.9 °C)   Resp 18   Ht 5' 4" (1.626 m)   Wt 78.5 kg (173 lb 1 oz)   LMP  (LMP Unknown)   SpO2 95%   Breastfeeding? No   BMI 29.71 kg/m²       FHT: 130s Cat 1 (reassuring) mod btb varability, + acels, no decels   CTX: q 2-3 minutes  SVE: 4/80/-2, IUPC placed      ASSESSMENT:   32 y.o.  at 39w6d,     FHT reassuring    Active Hospital Problems    Diagnosis  POA    *Indication for care in labor and delivery, antepartum [O75.9]  Yes      Resolved Hospital Problems    Diagnosis Date Resolved POA   No resolved problems to display.         PLAN:    Continue Close Maternal/Fetal Monitoring  Start Pitocin Augmentation per protocol  Recheck 2 hours or PRN  IUPC placed, will titrate up pitocin until adequate MVUS      "

## 2017-08-25 NOTE — ANESTHESIA PREPROCEDURE EVALUATION
2017  Tere Peña is a 32 y.o. female G1 @ 39/6 who presents with contractions and possible ROM. She states no previous problems with anesthesia.     Denies any neurologic, cardiopulmonary, endocrine, hepatorenal, or hematologic abnormalities.     OB History    Para Term  AB Living   1             SAB TAB Ectopic Multiple Live Births                  # Outcome Date GA Lbr Bhupinder/2nd Weight Sex Delivery Anes PTL Lv   1 Current                   Wt Readings from Last 1 Encounters:   17 0930 78.5 kg (173 lb 1 oz)       BP Readings from Last 3 Encounters:   17 117/67   17 114/79   17 119/78       Patient Active Problem List   Diagnosis    Supervision of normal first pregnancy/breast/?mirena    De Quervain's tenosynovitis    Indication for care in labor and delivery, antepartum       No past surgical history on file.    Social History     Social History    Marital status:      Spouse name: N/A    Number of children: N/A    Years of education: N/A     Occupational History    Not on file.     Social History Main Topics    Smoking status: Never Smoker    Smokeless tobacco: Never Used    Alcohol use No    Drug use: No    Sexual activity: Yes     Other Topics Concern    Not on file     Social History Narrative    No narrative on file         Chemistry    No results found for: NA, K, CL, CO2, BUN, CREATININE, GLU No results found for: CALCIUM, ALKPHOS, AST, ALT, BILITOT, ESTGFRAFRICA, EGFRNONAA         Lab Results   Component Value Date    WBC 15.12 (H) 2017    HGB 12.9 2017    HCT 38.4 2017    MCV 88 2017     2017       Anesthesia Evaluation    I have reviewed the Patient Summary Reports.     I have reviewed the Medications.     Review of Systems  Anesthesia Hx:  No problems with previous Anesthesia  History of prior  surgery of interest to airway management or planning: Denies Family Hx of Anesthesia complications.   Denies Personal Hx of Anesthesia complications.   Hematology/Oncology:  Hematology Normal   Oncology Normal     EENT/Dental:EENT/Dental Normal   Cardiovascular:  Cardiovascular Normal     Pulmonary:  Pulmonary Normal    Renal/:  Renal/ Normal     Hepatic/GI:  Hepatic/GI Normal    Musculoskeletal:  Musculoskeletal Normal    Neurological:  Neurology Normal    Endocrine:  Endocrine Normal    Psych:  Psychiatric Normal           Physical Exam  General:  Well nourished    Airway/Jaw/Neck:  Airway Findings: Mouth Opening: Normal Tongue: Normal  General Airway Assessment: Adult  Mallampati: III  Improves to II with phonation.  TM Distance: Normal, at least 6 cm      Dental:  Dental Findings: In tact   Chest/Lungs:  Chest/Lungs Findings: Normal Respiratory Rate     Heart/Vascular:  Heart Findings: Rate: Normal  Rhythm: Regular Rhythm        Mental Status:  Mental Status Findings:  Cooperative, Alert and Oriented         Anesthesia Plan  Type of Anesthesia, risks & benefits discussed:  Anesthesia Type:  CSE, spinal, general, epidural  Patient's Preference:   Intra-op Monitoring Plan: standard ASA monitors  Intra-op Monitoring Plan Comments:   Post Op Pain Control Plan:   Post Op Pain Control Plan Comments:   Induction:   IV  Beta Blocker:  Patient is not currently on a Beta-Blocker (No further documentation required).       Informed Consent: Patient understands risks and agrees with Anesthesia plan.  Questions answered. Anesthesia consent signed with patient.  ASA Score: 2     Day of Surgery Review of History & Physical:    H&P update referred to the surgeon.         Ready For Surgery From Anesthesia Perspective.

## 2017-08-25 NOTE — ED PROVIDER NOTES
Encounter Date: 2017       History     Chief Complaint   Patient presents with    Contractions    Rupture of Membranes     Tere Peña is a 32 y.o. F at 39w6d presents complaining of contractions q3-5 minutes. She was 1-2 cm dilated in clinic today. She also feels that she has been leaking but denies big gush of fluid.   This IUP is complicated by nothing.  Patient reports contractions, denies vaginal bleeding, reports LOF.   Fetal Movement: normal.            Review of patient's allergies indicates:  No Known Allergies  Past Medical History:   Diagnosis Date    PCOS (polycystic ovarian syndrome)      No past surgical history on file.  Family History   Problem Relation Age of Onset    Breast cancer Neg Hx     Colon cancer Neg Hx     Ovarian cancer Neg Hx      Social History   Substance Use Topics    Smoking status: Never Smoker    Smokeless tobacco: Never Used    Alcohol use No     Review of Systems   Constitutional: Negative for activity change, chills and fever.   Eyes: Negative for visual disturbance.   Respiratory: Negative for shortness of breath.    Cardiovascular: Negative for leg swelling.   Gastrointestinal: Positive for abdominal pain. Negative for constipation, diarrhea, nausea and vomiting.   Genitourinary: Negative for vaginal bleeding, vaginal discharge and vaginal pain.   Neurological: Negative for headaches.       Physical Exam     Initial Vitals   BP Pulse Resp Temp SpO2   17 0301 17 0300 17 0301 17 0301 17 0300   121/79 93 18 97.4 °F (36.3 °C) 98 %      MAP       17 0301       93         Physical Exam    Constitutional: She appears well-developed and well-nourished. She is not diaphoretic. She appears distressed (with contractions).   HENT:   Head: Normocephalic and atraumatic.   Eyes: Conjunctivae are normal.   Neck: Neck supple.   Cardiovascular: Normal rate.   Pulmonary/Chest: No respiratory distress.   Abdominal: Soft. There is no tenderness.  There is no rebound and no guarding.   Gravid, fundus NT   Neurological: She is alert and oriented to person, place, and time.   Skin: Skin is warm and dry. No rash noted. No erythema.   Psychiatric: She has a normal mood and affect. Her behavior is normal. Judgment and thought content normal.         ED Course   Fetal non-stress test  Date/Time: 8/30/2017 2:06 PM  Performed by: RACHAEL DAVE  Authorized by: MARILEE SARGENT     Consent Done?:  Not Needed  A time out verifies correct patient, procedure, equipment, support staff and site/side marked as required:   Nonstress Test:     Variability:  6-25 BPM    Decelerations:  None    Accelerations:  15 bpm    Baseline:  125    Contractions:  Irregular  Biophysical Profile:     Nonstress Test Interpretation: reactive      Overall Impression:  Reassuring      Labs Reviewed - No data to display                APC / Resident Notes:   2/50/-3 -> 2/80/-3 after 2.5h -> stadol/phenergan administered for symptomatic relief              ED Course   Comment By Time   Recheck of cervix showed that she was 4/80/-2.  Will plan on admit to labor and delivery.  Consents for delivery and blood transfusion discussed, R/B/A reviewed, questions and concerns addressed - signed by  due to pt altered after stadol - and placed in chart.  Rachael Dave DO 08/25 0859     Clinical Impression:   The primary encounter diagnosis was Indication for care in labor and delivery, antepartum. A diagnosis of 39 weeks gestation of pregnancy was also pertinent to this visit.              DO Rachael Olea DO  08/25/17 0921       Rachael Dave DO  08/30/17 1404

## 2017-08-25 NOTE — PROGRESS NOTES
"LABOR NOTE    S:  Complaints: No.  Epidural working:  yes  MD to bedside for cervical check     O: /64   Pulse 98   Temp 97.9 °F (36.6 °C)   Resp 18   Ht 5' 4" (1.626 m)   Wt 78.5 kg (173 lb 1 oz)   LMP  (LMP Unknown)   SpO2 96%   Breastfeeding? No   BMI 29.71 kg/m²       FHT: 130s Cat 1 (reassuring) mod btb varability, + acels, no decels   CTX: q 4-5 minutes  SVE: 80/-2      ASSESSMENT:   32 y.o.  at 39w6d,     FHT reassuring    Active Hospital Problems    Diagnosis  POA    *Indication for care in labor and delivery, antepartum [O75.9]  Yes      Resolved Hospital Problems    Diagnosis Date Resolved POA   No resolved problems to display.         PLAN:    Continue Close Maternal/Fetal Monitoring  Start Pitocin Augmentation per protocol  Recheck 2 hours or PRN      "

## 2017-08-25 NOTE — MEDICAL/APP STUDENT
"LABOR NOTE    S:  Complaints: No.  Epidural working:  yes      O: BP (!) 105/55   Pulse 91   Temp 97.9 °F (36.6 °C)   Resp 18   Ht 5' 4" (1.626 m)   Wt 78.5 kg (173 lb 1 oz)   LMP  (LMP Unknown)   SpO2 (!) 94%   Breastfeeding? No   BMI 29.71 kg/m²       FHT: 140s Cat 1 (reassuring); moderate variability, +accelerations, - decelerations,   MVUS 180-200, adequate since 13:30.  CTX: q 2-3 minutes  SVE: 580/-2      ASSESSMENT:   32 y.o.  at 39w6d, progressing well.    FHT reassuring    Active Hospital Problems    Diagnosis  POA    *Indication for care in labor and delivery, antepartum [O75.9]  Yes      Resolved Hospital Problems    Diagnosis Date Resolved POA   No resolved problems to display.         PLAN:    Continue Close Maternal/Fetal Monitoring  Pitocin Augmentation per protocol  Recheck 2 hours or PRN  IUPC placed, will continue to titrate up Pitocin until adequate MVUS maintained.   "

## 2017-08-25 NOTE — HOSPITAL COURSE
08/25/2017 - admitted for labor  8/26/2017 - Arrest of dilation -> primary low transverse c section, EBL 1930  08/27/2017 POD#1, meeting postoperative milestones  08/28/2017 POD#2 s/p LTCS, meeting post  Op milestones (ambulating, tolerating Po, both breast and bottle feeding).  08/29/2017 POD#3 doing well, meeting Post op milestones. Desires discharge.

## 2017-08-25 NOTE — PROGRESS NOTES
"LABOR NOTE    S:  Complaints: No.  Epidural working:  yes      O: /75   Pulse 103   Temp 97.6 °F (36.4 °C)   Resp 18   Ht 5' 4" (1.626 m)   Wt 78.5 kg (173 lb 1 oz)   LMP  (LMP Unknown)   SpO2 99%   Breastfeeding? No   BMI 29.71 kg/m²       FHT: 140s Cat 1 (reassuring); moderate variability, +accelerations, - decelerations,   MVUS 180-200, adequate since 13:30.  CTX: q 2-3 minutes  SVE: 580/-2      ASSESSMENT:   32 y.o.  at 39w6d, progressing well.    FHT reassuring    Active Hospital Problems    Diagnosis  POA    *Indication for care in labor and delivery, antepartum [O75.9]  Yes      Resolved Hospital Problems    Diagnosis Date Resolved POA   No resolved problems to display.         PLAN:    Continue Close Maternal/Fetal Monitoring  Pitocin Augmentation per protocol  Recheck 2 hours or PRN  IUPC in, will continue to titrate up Pitocin until adequate MVUS maintained with adequate cervical change  "

## 2017-08-25 NOTE — H&P
[facility]  Obstetrics  History & Physical    Patient Name: Tere Peña  MRN: [id number]  Admission Date: 2017  Primary Care Provider: Primary Doctor No    Subjective:     Principal Problem:Indication for care in labor and delivery, antepartum    History of Present Illness:  Tere Peña is a 32 y.o. F at 39w6d presents complaining of contractions q3-5 minutes. She was 1-2 cm dilated in clinic today. She also feels that she has been leaking but denies big gush of fluid. She was not found to be ruptured in [place], but cervix changed from 2 to 4 cm.  This IUP is uncomplicated.  Patient reports contractions, denies vaginal bleeding, reports LOF. Fetal Movement: normal.         Obstetric History       T0      L0     SAB0   TAB0   Ectopic0   Multiple0   Live Births0       # Outcome Date GA Lbr Bhupinder/2nd Weight Sex Delivery Anes PTL Lv   1 Current                 Past Medical History:   Diagnosis Date    PCOS (polycystic ovarian syndrome)      No past surgical history on file.    PTA Medications   Medication Sig    ondansetron (ZOFRAN-ODT) 4 MG TbDL Take 2 tablets (8 mg total) by mouth every 8 (eight) hours.    PNV59-iron,carb,fum-FA-DSS-dha (CITRANATAL HARMONY, IRON FUM,) 27 mg iron-1 mg -50 mg-260 mg Cap Take 1 capsule by mouth once daily.       Review of patient's allergies indicates:  No Known Allergies     Family History     None        Social History Main Topics    Smoking status: Never Smoker    Smokeless tobacco: Never Used    Alcohol use No    Drug use: No    Sexual activity: Yes     Review of Systems   Constitutional: Negative for activity change, appetite change, fatigue and fever.   Respiratory: Negative for cough and shortness of breath.    Cardiovascular: Negative for chest pain and palpitations.   Gastrointestinal: Positive for abdominal pain. Negative for constipation, diarrhea, nausea and vomiting.   Genitourinary: Negative for dysuria, frequency, pelvic pain,  vaginal bleeding and vaginal discharge.   Neurological: Negative for headaches.   Psychiatric/Behavioral: Negative for depression. The patient is not nervous/anxious.    Breast: Negative for breast mass and breast pain     Objective:     Vital Signs (Most Recent):  Temp: 97.9 °F (36.6 °C) (17 0709)  Pulse: 109 (17 1017)  Resp: 18 (17 0919)  BP: 117/67 (17 1016)  SpO2: 95 % (17 1016) Vital Signs (24h Range):  Temp:  [97.4 °F (36.3 °C)-97.9 °F (36.6 °C)] 97.9 °F (36.6 °C)  Pulse:  [] 109  Resp:  [16-18] 18  SpO2:  [92 %-98 %] 95 %  BP: (114-151)/(67-92) 117/67        There is no height or weight on file to calculate BMI.    FHT: 130bpm Cat 2 (reassuring), mod BTBV, +accels  TOCO: Q 2-3 minutes    Physical Exam:   Constitutional: She is oriented to person, place, and time. She appears well-developed and well-nourished.    HENT:   Head: Normocephalic and atraumatic.     Neck: Normal range of motion.    Cardiovascular: Normal rate, regular rhythm and normal heart sounds.     Pulmonary/Chest: Effort normal and breath sounds normal.        Abdominal: Soft. Bowel sounds are normal. She exhibits no distension. There is no tenderness.   Gravid 39w             Musculoskeletal: Normal range of motion and moves all extremeties.       Neurological: She is alert and oriented to person, place, and time.    Skin: Skin is warm and dry.    Psychiatric: She has a normal mood and affect.       Cervix:  Dilation:  4  Effacement:  75%  Station: -2  Presentation: Vertex     Significant Labs:  Lab Results   Component Value Date    GROUPTRH B POS 2017    HEPBSAG Negative 2017    STREPBCULT No Group B Streptococcus isolated 2017       I have personallly reviewed all pertinent lab results from the last 24 hours.         Assessment/Plan:     32 y.o. female  at 39w6d for:    * Indication for care in labor and delivery, antepartum    - Consents signed and to chart  - Admit to Labor and  Delivery unit  - Plan for active labor management    - Epidural per Anesthesia  - Draw CBC, T&S  - Notify Staff  - Ultrasound performed, fetus in vertex position.  - Recheck in 2 hrs or PRN  - Post-Partum Hemorrhage risk - low              Radha Camilo MD  Obstetrics  [facility]

## 2017-08-26 PROCEDURE — 36000685 HC CESAREAN SECTION LEVEL I

## 2017-08-26 PROCEDURE — 37000008 HC ANESTHESIA 1ST 15 MINUTES: Performed by: OBSTETRICS & GYNECOLOGY

## 2017-08-26 PROCEDURE — 11000001 HC ACUTE MED/SURG PRIVATE ROOM

## 2017-08-26 PROCEDURE — 37000009 HC ANESTHESIA EA ADD 15 MINS: Performed by: OBSTETRICS & GYNECOLOGY

## 2017-08-26 PROCEDURE — 25000003 PHARM REV CODE 250: Performed by: OBSTETRICS & GYNECOLOGY

## 2017-08-26 PROCEDURE — 63600175 PHARM REV CODE 636 W HCPCS: Performed by: OBSTETRICS & GYNECOLOGY

## 2017-08-26 PROCEDURE — 51702 INSERT TEMP BLADDER CATH: CPT

## 2017-08-26 PROCEDURE — 63600175 PHARM REV CODE 636 W HCPCS: Performed by: ANESTHESIOLOGY

## 2017-08-26 PROCEDURE — 99900035 HC TECH TIME PER 15 MIN (STAT)

## 2017-08-26 PROCEDURE — 25000003 PHARM REV CODE 250: Performed by: ANESTHESIOLOGY

## 2017-08-26 PROCEDURE — S0028 INJECTION, FAMOTIDINE, 20 MG: HCPCS | Performed by: ANESTHESIOLOGY

## 2017-08-26 PROCEDURE — 59514 CESAREAN DELIVERY ONLY: CPT | Mod: GB,,, | Performed by: OBSTETRICS & GYNECOLOGY

## 2017-08-26 PROCEDURE — 96372 THER/PROPH/DIAG INJ SC/IM: CPT

## 2017-08-26 PROCEDURE — 82803 BLOOD GASES ANY COMBINATION: CPT

## 2017-08-26 RX ORDER — OXYCODONE AND ACETAMINOPHEN 10; 325 MG/1; MG/1
1 TABLET ORAL EVERY 4 HOURS PRN
Status: DISCONTINUED | OUTPATIENT
Start: 2017-08-27 | End: 2017-08-29 | Stop reason: HOSPADM

## 2017-08-26 RX ORDER — SIMETHICONE 80 MG
1 TABLET,CHEWABLE ORAL EVERY 6 HOURS PRN
Status: DISCONTINUED | OUTPATIENT
Start: 2017-08-26 | End: 2017-08-29 | Stop reason: HOSPADM

## 2017-08-26 RX ORDER — HYDROMORPHONE HYDROCHLORIDE 2 MG/ML
INJECTION, SOLUTION INTRAMUSCULAR; INTRAVENOUS; SUBCUTANEOUS
Status: DISCONTINUED | OUTPATIENT
Start: 2017-08-26 | End: 2017-08-26

## 2017-08-26 RX ORDER — OXYCODONE HYDROCHLORIDE 5 MG/1
10 TABLET ORAL EVERY 4 HOURS PRN
Status: DISCONTINUED | OUTPATIENT
Start: 2017-08-26 | End: 2017-08-29 | Stop reason: HOSPADM

## 2017-08-26 RX ORDER — OXYTOCIN/RINGER'S LACTATE 20/1000 ML
41.65 PLASTIC BAG, INJECTION (ML) INTRAVENOUS CONTINUOUS
Status: ACTIVE | OUTPATIENT
Start: 2017-08-26 | End: 2017-08-26

## 2017-08-26 RX ORDER — NALOXONE HCL 0.4 MG/ML
0.04 VIAL (ML) INJECTION EVERY 5 MIN PRN
Status: DISCONTINUED | OUTPATIENT
Start: 2017-08-26 | End: 2017-08-29 | Stop reason: HOSPADM

## 2017-08-26 RX ORDER — OXYTOCIN 10 [USP'U]/ML
INJECTION, SOLUTION INTRAMUSCULAR; INTRAVENOUS
Status: DISCONTINUED | OUTPATIENT
Start: 2017-08-26 | End: 2017-08-26

## 2017-08-26 RX ORDER — LIDOCAINE HCL/EPINEPHRINE/PF 2%-1:200K
VIAL (ML) INJECTION
Status: DISCONTINUED | OUTPATIENT
Start: 2017-08-26 | End: 2017-08-26

## 2017-08-26 RX ORDER — ONDANSETRON 2 MG/ML
4 INJECTION INTRAMUSCULAR; INTRAVENOUS EVERY 8 HOURS PRN
Status: DISCONTINUED | OUTPATIENT
Start: 2017-08-26 | End: 2017-08-29 | Stop reason: HOSPADM

## 2017-08-26 RX ORDER — DIPHENHYDRAMINE HCL 25 MG
25 CAPSULE ORAL EVERY 4 HOURS PRN
Status: DISCONTINUED | OUTPATIENT
Start: 2017-08-26 | End: 2017-08-29 | Stop reason: HOSPADM

## 2017-08-26 RX ORDER — OXYCODONE HYDROCHLORIDE 5 MG/1
5 TABLET ORAL EVERY 4 HOURS PRN
Status: DISCONTINUED | OUTPATIENT
Start: 2017-08-26 | End: 2017-08-29 | Stop reason: HOSPADM

## 2017-08-26 RX ORDER — CEFAZOLIN SODIUM 2 G/50ML
2 SOLUTION INTRAVENOUS ONCE
Status: COMPLETED | OUTPATIENT
Start: 2017-08-26 | End: 2017-08-26

## 2017-08-26 RX ORDER — ACETAMINOPHEN 325 MG/1
650 TABLET ORAL EVERY 6 HOURS
Status: COMPLETED | OUTPATIENT
Start: 2017-08-26 | End: 2017-08-27

## 2017-08-26 RX ORDER — PHENYLEPHRINE HYDROCHLORIDE 10 MG/ML
INJECTION INTRAVENOUS
Status: DISCONTINUED | OUTPATIENT
Start: 2017-08-26 | End: 2017-08-26

## 2017-08-26 RX ORDER — KETOROLAC TROMETHAMINE 30 MG/ML
INJECTION, SOLUTION INTRAMUSCULAR; INTRAVENOUS
Status: DISCONTINUED | OUTPATIENT
Start: 2017-08-26 | End: 2017-08-26

## 2017-08-26 RX ORDER — IBUPROFEN 600 MG/1
600 TABLET ORAL EVERY 6 HOURS
Status: DISCONTINUED | OUTPATIENT
Start: 2017-08-27 | End: 2017-08-29 | Stop reason: HOSPADM

## 2017-08-26 RX ORDER — ACETAMINOPHEN 10 MG/ML
INJECTION, SOLUTION INTRAVENOUS
Status: DISCONTINUED | OUTPATIENT
Start: 2017-08-26 | End: 2017-08-26

## 2017-08-26 RX ORDER — ONDANSETRON 8 MG/1
8 TABLET, ORALLY DISINTEGRATING ORAL EVERY 8 HOURS PRN
Status: DISCONTINUED | OUTPATIENT
Start: 2017-08-26 | End: 2017-08-29 | Stop reason: HOSPADM

## 2017-08-26 RX ORDER — FENTANYL CITRATE 50 UG/ML
INJECTION, SOLUTION INTRAMUSCULAR; INTRAVENOUS
Status: DISCONTINUED | OUTPATIENT
Start: 2017-08-26 | End: 2017-08-26

## 2017-08-26 RX ORDER — HYDROCORTISONE 25 MG/G
CREAM TOPICAL 3 TIMES DAILY PRN
Status: DISCONTINUED | OUTPATIENT
Start: 2017-08-26 | End: 2017-08-29 | Stop reason: HOSPADM

## 2017-08-26 RX ORDER — DIPHENOXYLATE HYDROCHLORIDE AND ATROPINE SULFATE 2.5; .025 MG/1; MG/1
1 TABLET ORAL ONCE
Status: COMPLETED | OUTPATIENT
Start: 2017-08-26 | End: 2017-08-26

## 2017-08-26 RX ORDER — MISOPROSTOL 200 UG/1
200 TABLET ORAL EVERY 6 HOURS
Status: COMPLETED | OUTPATIENT
Start: 2017-08-26 | End: 2017-08-26

## 2017-08-26 RX ORDER — AMOXICILLIN 250 MG
1 CAPSULE ORAL NIGHTLY PRN
Status: DISCONTINUED | OUTPATIENT
Start: 2017-08-26 | End: 2017-08-29 | Stop reason: HOSPADM

## 2017-08-26 RX ORDER — KETOROLAC TROMETHAMINE 30 MG/ML
30 INJECTION, SOLUTION INTRAMUSCULAR; INTRAVENOUS EVERY 6 HOURS
Status: COMPLETED | OUTPATIENT
Start: 2017-08-26 | End: 2017-08-26

## 2017-08-26 RX ORDER — SODIUM CHLORIDE, SODIUM LACTATE, POTASSIUM CHLORIDE, CALCIUM CHLORIDE 600; 310; 30; 20 MG/100ML; MG/100ML; MG/100ML; MG/100ML
INJECTION, SOLUTION INTRAVENOUS CONTINUOUS
Status: ACTIVE | OUTPATIENT
Start: 2017-08-26 | End: 2017-08-26

## 2017-08-26 RX ORDER — OXYCODONE AND ACETAMINOPHEN 5; 325 MG/1; MG/1
1 TABLET ORAL EVERY 4 HOURS PRN
Status: DISCONTINUED | OUTPATIENT
Start: 2017-08-27 | End: 2017-08-29 | Stop reason: HOSPADM

## 2017-08-26 RX ORDER — DOCUSATE SODIUM 100 MG/1
200 CAPSULE, LIQUID FILLED ORAL 2 TIMES DAILY
Status: DISCONTINUED | OUTPATIENT
Start: 2017-08-26 | End: 2017-08-29 | Stop reason: HOSPADM

## 2017-08-26 RX ORDER — ONDANSETRON HYDROCHLORIDE 2 MG/ML
INJECTION, SOLUTION INTRAMUSCULAR; INTRAVENOUS
Status: DISCONTINUED | OUTPATIENT
Start: 2017-08-26 | End: 2017-08-26

## 2017-08-26 RX ADMIN — MISOPROSTOL 200 MCG: 200 TABLET ORAL at 05:08

## 2017-08-26 RX ADMIN — KETOROLAC TROMETHAMINE 30 MG: 30 INJECTION, SOLUTION INTRAMUSCULAR; INTRAVENOUS at 04:08

## 2017-08-26 RX ADMIN — LIDOCAINE HYDROCHLORIDE,EPINEPHRINE BITARTRATE 5 ML: 20; .005 INJECTION, SOLUTION EPIDURAL; INFILTRATION; INTRACAUDAL; PERINEURAL at 03:08

## 2017-08-26 RX ADMIN — KETOROLAC TROMETHAMINE 30 MG: 30 INJECTION, SOLUTION INTRAMUSCULAR at 04:08

## 2017-08-26 RX ADMIN — PHENYLEPHRINE HYDROCHLORIDE 100 MCG: 10 INJECTION INTRAVENOUS at 03:08

## 2017-08-26 RX ADMIN — ACETAMINOPHEN 650 MG: 325 TABLET ORAL at 10:08

## 2017-08-26 RX ADMIN — ACETAMINOPHEN 1000 MG: 10 INJECTION, SOLUTION INTRAVENOUS at 04:08

## 2017-08-26 RX ADMIN — Medication 12 ML/HR: at 12:08

## 2017-08-26 RX ADMIN — DOCUSATE SODIUM 200 MG: 100 CAPSULE, LIQUID FILLED ORAL at 10:08

## 2017-08-26 RX ADMIN — CARBOPROST TROMETHAMINE 250 MCG: 250 INJECTION, SOLUTION INTRAMUSCULAR at 04:08

## 2017-08-26 RX ADMIN — HYDROMORPHONE HYDROCHLORIDE 0.6 MG: 2 INJECTION, SOLUTION INTRAMUSCULAR; INTRAVENOUS; SUBCUTANEOUS at 04:08

## 2017-08-26 RX ADMIN — CEFAZOLIN SODIUM 2 G: 2 SOLUTION INTRAVENOUS at 02:08

## 2017-08-26 RX ADMIN — ONDANSETRON 4 MG: 2 INJECTION, SOLUTION INTRAMUSCULAR; INTRAVENOUS at 04:08

## 2017-08-26 RX ADMIN — OXYTOCIN 3 UNITS: 10 INJECTION, SOLUTION INTRAMUSCULAR; INTRAVENOUS at 04:08

## 2017-08-26 RX ADMIN — SODIUM CITRATE AND CITRIC ACID MONOHYDRATE 30 ML: 500; 334 SOLUTION ORAL at 03:08

## 2017-08-26 RX ADMIN — ACETAMINOPHEN 650 MG: 325 TABLET ORAL at 09:08

## 2017-08-26 RX ADMIN — MISOPROSTOL 200 MCG: 200 TABLET ORAL at 11:08

## 2017-08-26 RX ADMIN — FENTANYL CITRATE 100 MCG: 50 INJECTION, SOLUTION INTRAMUSCULAR; INTRAVENOUS at 03:08

## 2017-08-26 RX ADMIN — MISOPROSTOL 200 MCG: 200 TABLET ORAL at 12:08

## 2017-08-26 RX ADMIN — PHENYLEPHRINE HYDROCHLORIDE 200 MCG: 10 INJECTION INTRAVENOUS at 04:08

## 2017-08-26 RX ADMIN — FAMOTIDINE 20 MG: 10 INJECTION, SOLUTION INTRAVENOUS at 03:08

## 2017-08-26 RX ADMIN — METHYLERGONOVINE MALEATE 200 MCG: 0.2 INJECTION, SOLUTION INTRAMUSCULAR; INTRAVENOUS at 04:08

## 2017-08-26 RX ADMIN — KETOROLAC TROMETHAMINE 30 MG: 30 INJECTION, SOLUTION INTRAMUSCULAR at 09:08

## 2017-08-26 RX ADMIN — ACETAMINOPHEN 650 MG: 325 TABLET ORAL at 04:08

## 2017-08-26 RX ADMIN — DOCUSATE SODIUM 200 MG: 100 CAPSULE, LIQUID FILLED ORAL at 09:08

## 2017-08-26 RX ADMIN — DIPHENOXYLATE HYDROCHLORIDE AND ATROPINE SULFATE 1 TABLET: 2.5; .025 TABLET ORAL at 04:08

## 2017-08-26 RX ADMIN — AZITHROMYCIN MONOHYDRATE 500 MG: 500 INJECTION, POWDER, LYOPHILIZED, FOR SOLUTION INTRAVENOUS at 02:08

## 2017-08-26 RX ADMIN — MISOPROSTOL 200 MCG: 200 TABLET ORAL at 06:08

## 2017-08-26 RX ADMIN — KETOROLAC TROMETHAMINE 30 MG: 30 INJECTION, SOLUTION INTRAMUSCULAR at 10:08

## 2017-08-26 NOTE — TRANSFER OF CARE
"Anesthesia Transfer of Care Note    Patient: Tere Peña    Procedure(s) Performed: Procedure(s) (LRB):  DELIVERY- SECTION (N/A)    Patient location: PACU    Anesthesia Type: epidural    Transport from OR: Transported from OR on room air with adequate spontaneous ventilation    Post pain: adequate analgesia    Post assessment: no apparent anesthetic complications    Post vital signs: stable    Level of consciousness: awake, alert and oriented    Nausea/Vomiting: no nausea/vomiting    Complications: none    Transfer of care protocol was followed      Last vitals:   Visit Vitals  BP (!) 105/55   Pulse (!) 115   Temp 36.8 °C (98.3 °F) (Oral)   Resp 16   Ht 5' 4" (1.626 m)   Wt 78.5 kg (173 lb 1 oz)   LMP  (LMP Unknown)   SpO2 95%   Breastfeeding? No   BMI 29.71 kg/m²     "

## 2017-08-26 NOTE — DISCHARGE INSTRUCTIONS
Breastfeeding Discharge Instructions       Feed the baby at the earliest sign of hunger or comfort  o Hands to mouth, sucking motions  o Rooting or searching for something to suck on  o Dont wait for crying - it is a sign of distress     The feedings may be 8-12 times per 24hrs and will not follow a schedule   Avoid pacifiers and bottles for the first 4 weeks   Alternate the breast you start the feeding with, or start with the breast that feels the fullest   Switch breasts when the baby takes himself off the breast or falls asleep   Keep offering breasts until the baby looks full, no longer gives hunger signs, and stays asleep when placed on his back in the crib   If the baby is sleepy and wont wake for a feeding, put the baby skin-to-skin dressed in a diaper against the mothers bare chest   Sleep near your baby   The baby should be positioned and latched on to the breast correctly  o Chest-to-chest, chin in the breast  o Babys lips are flipped outward  o Babys mouth is stretched open wide like a shout  o Babys sucking should feel like tugging to the mother  - The baby should be drinking at the breast:  o You should hear swallowing or gulping throughout the feeding  o You should see milk on the babys lips when he comes off the breast  o Your breasts should be softer when the baby is finished feeding  o The baby should look relaxed at the end of feedings  o After the 4th day and your milk is in:  o The babys poop should turn bright yellow and be loose, watery, and seedy  o The baby should have at least 3-4 poops the size of the palm of your hand per day  o The baby should have at least 5-6 wet diapers per day  o The urine should be light yellow in color  You should drink when you are thirsty and eat a healthy diet when you are    hungry.     Take naps to get the rest you need.   Take medications and/or drink alcohol only with permission of your obstetrician    or the babys pediatrician.  You can  also call the [facility] Risk [facility],   ([phone]), Monday-Friday, 8am-5pm Central time, to get the most   up-to-date evidence-based information on the use of medications during   pregnancy and breastfeeding.      The baby should be examined by a pediatrician at 3-5 days of age.  Once your   milk comes in, the baby should be gaining at least ½ - 1oz each day and should be back to birthweight no later than 10-14 days of age.          Community Resources    [facility] Breastfeeding Warmline: [phone]   Local [facility] clinics: provide incentives and breastpumps to eligible mothers  La Leche [employer] (LLLI):  mother-to-mother support group website        www.[employer]  Local La Leche League mother-to-mother support groups:        www.[facility]        La Leche League [facility]   Dr. Humberto Michaels website for latch videos and general information:        www.breastfeedinginc.ca  [facility] is a call center that provides information about the safety of taking medications while breastfeeding.  Call [phone], M-F, 8am-5pm, CT.  International Lactation Consultant Association provides resources for assistance:        www.ilca.org  [facility] Breastfeeding Coalition provides informationand resources for parents  and the community    http://[facility].org     Jessi Lopez is a mom-to-mom support group:                             www.[employer].com//breastfeedng-support/  [employer] for Healthy Babies:  9-728-532-BABY(8291)  [employer]: a breastfeeding support group for women of color, [phone]

## 2017-08-26 NOTE — PROGRESS NOTES
"LABOR NOTE    S:  Complaints: No.  Epidural working:  yes      O: /81   Pulse 97   Temp 97.6 °F (36.4 °C)   Resp 18   Ht 5' 4" (1.626 m)   Wt 78.5 kg (173 lb 1 oz)   LMP  (LMP Unknown)   SpO2 97%   Breastfeeding? No   BMI 29.71 kg/m²       FHT: 140s Cat 1 (reassuring); moderate variability, +accelerations, - decelerations,  CTX: q 2-3 minutes, pit at 21, MVUS 200s  SVE: 5/80/-2, same check since 3pm      ASSESSMENT:   32 y.o.  at 39w6d, labor    FHT reassuring    Active Hospital Problems    Diagnosis  POA    *Indication for care in labor and delivery, antepartum [O75.9]  Yes      Resolved Hospital Problems    Diagnosis Date Resolved POA   No resolved problems to display.         PLAN:    Continue Close Maternal/Fetal Monitoring  Pitocin Augmentation per protocol  IUPC - keep MvUs adequate    No cervical change x 4 hours with adequate contractions, ruptured since 0200. Discussed possibility of c/s with patient and family, stated they want to do what is best for the baby.      Radha Camilo M.D.  PGY-4 OB/GYN    "

## 2017-08-26 NOTE — PLAN OF CARE
Problem: Patient Care Overview  Goal: Plan of Care Review  Outcome: Ongoing (interventions implemented as appropriate)   Requested partner have patient call lactation for latch assistance; patient will breastfeed baby on cue until content at least 8 times in 24 hours observing for signs of milk transfer; she will wake baby prn;

## 2017-08-26 NOTE — PROGRESS NOTES
"S: 32 y.o.  at 39w6d     O:   /65   Pulse 98   Temp 98.4 °F (36.9 °C) (Temporal)   Resp 16   Ht 5' 4" (1.626 m)   Wt 78.5 kg (173 lb 1 oz)   LMP  (LMP Unknown)   SpO2 99%   Breastfeeding? No   BMI 29.71 kg/m²     FHT: 152, +accels  [place]/IUPC: every 2 minutes  SVE:5/80/-2 unchanged cervix      ASSESSMENT: 39w6d   Patient Active Problem List   Diagnosis    Supervision of normal first pregnancy/breast/?cyndy Owens's tenosynovitis    Indication for care in labor and delivery, antepartum       PLAN:    Continue Close Maternal/Fetal Monitoring  Pitocin Augmentation per protocol  Recheck 2 hours or PRN  "

## 2017-08-26 NOTE — PROGRESS NOTES
"S: 32 y.o.  at 40w, labor, comfortable with epidural     O:   BP (!) 127/59   Pulse 98   Temp 98.3 °F (36.8 °C) (Oral)   Resp 16   Ht 5' 4" (1.626 m)   Wt 78.5 kg (173 lb 1 oz)   LMP  (LMP Unknown)   SpO2 98%   Breastfeeding? No   BMI 29.71 kg/m²     FHT: 150, +accels, mod BTBV, no decels  [place]/IUPC: every 2-3 minutes, pit at 30, IUPC fell out  SVE:5/80/-2 unchanged cervix      ASSESSMENT: 39w6d   Patient Active Problem List   Diagnosis    Supervision of normal first pregnancy/breast/?mircarmen    De Queralexy's tenosynovitis    Indication for care in labor and delivery, antepartum       PLAN:  Again discussed lack of cervical change x 11 hours with ruptured membranes for 15h with patient and family. Recommend 1LTCS for arrest of dilation.  Patient and family are in agreement with the plan and wish to proceed with 1LTCS for arrest of dilation. All risks/benefits/alternatives discussed. All questions answered.    DC pitocin now  To OR for 1LTCS for arrest of dilation  Ancef and azithromycin ordered  Case request placed    D/W Heather Camilo M.D.  PGY-4 OB/GYN    "

## 2017-08-26 NOTE — L&D DELIVERY NOTE
Section Procedure Note    Procedure: Primary Low Transverse  Section via pfannenstiel skin incision    Indications: 32 y.o.  at 40w0d admitted in labor, augmented with pitocin and AROM, taken to OR for arrest of dilation.    Pre-operative Diagnosis:   1. IUP at 40 week 0 day pregnancy  2. Prolonged ROM  3. Arrest of dilation    Post-operative Diagnosis: same    Surgeon: Dr. Romero     Assistants: Radha Camilo, PGY4    Anesthesia: Spinal anesthesia    Findings:    1. Single viable  male infant, with APGARS 6/9, weight 4024g.  2. Normal appearing uterus, ovaries, and fallopian tubes.  3. Nuchal x 2, OP presentation of fetus  4. PPH due to uterine atony    Estimated Blood Loss:  1930 mL           Total IV Fluids: 1100 mL     UOP: 600 mL    Specimens: None    PreOp CBC:   Lab Results   Component Value Date    WBC 15.12 (H) 2017    HGB 12.9 2017    HCT 38.4 2017    MCV 88 2017     2017                     Complications:  None; patient tolerated the procedure well.           Disposition: PACU - hemodynamically stable.           Condition: stable    Procedure Details   The patient was seen in the Holding Room. The risks, benefits, complications, treatment options, and expected outcomes were discussed with the patient.  The patient concurred with the proposed plan, giving informed consent.  The site of surgery properly noted. The patient was taken to Operating Room, identified as Tere Peña and the procedure verified as  Delivery. A Time Out was held and the above information confirmed.    After induction of anesthesia, the patient was prepped and draped in the usual sterile manner while placed in a dorsal supine position with a left lateral tilt.  A cedillo catheter was also placed per nursing. Preoperative antibiotics, ancef and azithromycin, were administered and an allis test was performed yielding adequate anesthesia.  A Pfannenstiel incision  was made and carried down through the subcutaneous tissue to the fascia. Fascial incision was made and extended transversely. The fascia was grasped with [facility] clamps and  from the underlying rectus tissue superiorly and inferiorly. The peritoneum was identified, found to be free of adherent bowel and entered sharply. Peritoneal incision was extended longitudinally. The vesico-uterine peritoneum was identified and bladder blade was inserted. The vesico-uterine peritoneum was incised transversely and the bladder flap was bluntly freed from the lower uterine segment. The bladder blade was reinserted to keep the bladder out of the operative field. A low transverse uterine incision was made with knife and extended with finger fracture. The infant was noted to be in cephalic position. The head was brought to the incision and elevated out of the pelvis, noted to be OP with nuchal x 2.. The patient delivered a single viable male infant without difficulty. After the umbilical cord was clamped and cut cord blood was obtained for evaluation. The placenta was removed intact and appeared normal and was discarded. The uterus was exteriorized. The uterine outline, tubes and ovaries appeared normal. Uterine atony was noted, cytotec, methergine, hemabate, 40 of pitocin were given. The uterine incision was closed with running locked sutures of 0 chromic, atony began to improve. Hemostasis was observed. The uterus was returned to the abdominal cavity. Incision was reinspected and good hemostasis was noted. The abdominal cavity was irrigated to remove clots. Muscle was reapproximated with 2-0 chromic. The fascia was then reapproximated with running sutures of 0 PDS. The skin was reapproximated with 4-0 monocryl.    Instrument, sponge, and needle counts were correct prior the abdominal closure and at the conclusion of the case.     Pt tolerated procedure well and was in stable condition after the procedure.        Radha  ROBERT Camilo  PGY-4 OB/GYN      Delivery Information for  Chemo Peña    Birth information:  YOB: 2017   Time of birth: 4:02 AM   Sex: male   Head Delivery Date/Time: 2017  4:02 AM   Delivery type: , Low Transverse   Gestational Age: 40w0d    Delivery Providers    Delivering clinician:  Carolee Romero MD   Other personnel:   Provider Role   MD Luann Grace, SAM Hunt, MD Ruthann Chapman MD                 Measurements    Weight:  4024 g Length:  54 cm   Head circum.:  35.6 cm Chest circum.:  35.6 cm           Assessment    Living status:  Living  Apgars:     1 Minute:   5 Minute:   10 Minute:   15 Minute:   20 Minute:     Skin Color:   0  1       Heart Rate:   2  2       Reflex Irritability:   2  2       Muscle Tone:   1  2       Respiratory Effort:   1  2       Total:   6  9                      Assisted Delivery Details:    Forceps attempted?:  No  Vacuum extractor attempted?:  No         Shoulder Dystocia    Shoulder dystocia present?:  No           Presentation and Position    Presentation:   Vertex   Position:   Middle    Occiput    Anterior            Interventions/Resuscitation    Method:  Bulb Suctioning, Tactile Stimulation       Cord    Vessels:  3 vessels  Complications:  Nuchal  Nuchal Intervention:  reduced  Nuchal Cord Description:  loose nuchal cord  Number of Loops:  2  Delayed Cord Clamping?:  Yes  Cord Clamped Date/Time:  2017  4:02 AM  Cord Blood Disposition:  Sent with Baby  Gases Sent?:  Yes  Stem Cell Collection (by MD):  No       Placenta    Date and time:  2017  4:02 AM  Removal:  Spontaneous  Appearance:  Intact  Placenta disposition:  discarded           Labor Events:       labor: No     Labor Onset Date/Time:         Dilation Complete Date/Time:         Start Pushing Date/Time:       Rupture Date/Time: 17  0858         Rupture type:            Fluid Amount:        Fluid Color:        Fluid Odor:        Membrane Status (PeriCalm): SRM (Spontaneous Rupture)      Rupture Date/Time (PeriCalm): 2017 02:00:00      Fluid Amount (PeriCalm): Small      Fluid Color (PeriCalm): Clear       steroids: None     Antibiotics given for GBS: No     Induction: oxytocin     Indications for induction:  Premature ROM     Augmentation:       Indications for augmentation:       Labor complications: Failure to Progress in First Stage     Additional complications:          Cervical ripening:                     Delivery:      Episiotomy: None     Indication for Episiotomy:       Perineal Lacerations: None Repaired:      Periurethral Laceration: none Repaired:     Labial Laceration: none Repaired:     Sulcus Laceration: none Repaired:     Vaginal Laceration: No Repaired:     Cervical Laceration: No Repaired:     Repair suture:       Repair # of packets: 9     Vaginal delivery QBL (mL): 0      QBL (mL): 0     Combined Blood Loss (mL):      Vaginal Sweep Performed: Yes     Surgicount Correct: Yes       Other providers:       Anesthesia    Method:  Epidural          Details (if applicable):  Trial of Labor Yes    Categorization: Primary    Priority: Routine   Indications for : Failure to Progress   Incision Type: low transverse     Additional  information:  Forceps:    Vacuum:    Breech:    Observed anomalies    Other (Comments):

## 2017-08-26 NOTE — PROGRESS NOTES
"S: 32 y.o.  at 39w6d, labor, comfortable with epidural     O:   /65   Pulse 98   Temp 98.4 °F (36.9 °C) (Temporal)   Resp 16   Ht 5' 4" (1.626 m)   Wt 78.5 kg (173 lb 1 oz)   LMP  (LMP Unknown)   SpO2 99%   Breastfeeding? No   BMI 29.71 kg/m²     FHT: 150, +accels, mod BTBV, no decels  [place]/IUPC: every 3-4 minutes, MvU 140s, pit at 30  SVE:5/80/-2 unchanged cervix      ASSESSMENT: 39w6d   Patient Active Problem List   Diagnosis    Supervision of normal first pregnancy/breast/?mircarmen    De Richard's tenosynovitis    Indication for care in labor and delivery, antepartum       PLAN:    Discussed lack of cervical change x 9 hours with ruptured membranes for 12h with patient and family. Recommend 1LTCS for arrest of dilation.  Patient and family are not yet ready for  delivery unless there are signs of fetal distress. Wish to continue pitocin and recheck in 1-2 hours. Will consider c/s at that time if still unchanged.    Repeat cervical exam in 1-2h  Continue close maternal fetal monitoring  Continue pitocin    Radha Camilo M.D.  PGY-4 OB/GYN    "

## 2017-08-27 PROBLEM — D62 ACUTE BLOOD LOSS ANEMIA: Status: ACTIVE | Noted: 2017-08-27

## 2017-08-27 LAB
BASOPHILS # BLD AUTO: 0.01 K/UL
BASOPHILS NFR BLD: 0.1 %
DIFFERENTIAL METHOD: ABNORMAL
EOSINOPHIL # BLD AUTO: 0.1 K/UL
EOSINOPHIL NFR BLD: 0.3 %
ERYTHROCYTE [DISTWIDTH] IN BLOOD BY AUTOMATED COUNT: 13.3 %
HCT VFR BLD AUTO: 25.1 %
HGB BLD-MCNC: 8.4 G/DL
LYMPHOCYTES # BLD AUTO: 1.8 K/UL
LYMPHOCYTES NFR BLD: 11.9 %
MCH RBC QN AUTO: 29.4 PG
MCHC RBC AUTO-ENTMCNC: 33.5 G/DL
MCV RBC AUTO: 88 FL
MONOCYTES # BLD AUTO: 1.3 K/UL
MONOCYTES NFR BLD: 8.4 %
NEUTROPHILS # BLD AUTO: 11.7 K/UL
NEUTROPHILS NFR BLD: 78.9 %
PLATELET # BLD AUTO: 188 K/UL
PMV BLD AUTO: 9.7 FL
RBC # BLD AUTO: 2.86 M/UL
WBC # BLD AUTO: 14.8 K/UL

## 2017-08-27 PROCEDURE — 25000003 PHARM REV CODE 250: Performed by: ANESTHESIOLOGY

## 2017-08-27 PROCEDURE — 85025 COMPLETE CBC W/AUTO DIFF WBC: CPT

## 2017-08-27 PROCEDURE — 36415 COLL VENOUS BLD VENIPUNCTURE: CPT

## 2017-08-27 PROCEDURE — 99231 SBSQ HOSP IP/OBS SF/LOW 25: CPT | Mod: ,,, | Performed by: OBSTETRICS & GYNECOLOGY

## 2017-08-27 PROCEDURE — 25000003 PHARM REV CODE 250: Performed by: OBSTETRICS & GYNECOLOGY

## 2017-08-27 PROCEDURE — 11000001 HC ACUTE MED/SURG PRIVATE ROOM

## 2017-08-27 RX ORDER — IRON POLYSACCHARIDE COMPLEX 150 MG
150 CAPSULE ORAL DAILY
Status: DISCONTINUED | OUTPATIENT
Start: 2017-08-27 | End: 2017-08-29 | Stop reason: HOSPADM

## 2017-08-27 RX ADMIN — ACETAMINOPHEN 650 MG: 325 TABLET ORAL at 05:08

## 2017-08-27 RX ADMIN — OXYCODONE HYDROCHLORIDE AND ACETAMINOPHEN 1 TABLET: 5; 325 TABLET ORAL at 01:08

## 2017-08-27 RX ADMIN — OXYCODONE HYDROCHLORIDE AND ACETAMINOPHEN 1 TABLET: 5; 325 TABLET ORAL at 10:08

## 2017-08-27 RX ADMIN — IBUPROFEN 600 MG: 600 TABLET, FILM COATED ORAL at 05:08

## 2017-08-27 RX ADMIN — IBUPROFEN 600 MG: 600 TABLET, FILM COATED ORAL at 11:08

## 2017-08-27 RX ADMIN — DOCUSATE SODIUM 200 MG: 100 CAPSULE, LIQUID FILLED ORAL at 08:08

## 2017-08-27 RX ADMIN — Medication 150 MG: at 01:08

## 2017-08-27 RX ADMIN — OXYCODONE HYDROCHLORIDE AND ACETAMINOPHEN 1 TABLET: 5; 325 TABLET ORAL at 05:08

## 2017-08-27 RX ADMIN — OXYCODONE HYDROCHLORIDE 5 MG: 5 TABLET ORAL at 08:08

## 2017-08-27 NOTE — LACTATION NOTE
08/27/17 1230   Maternal Infant Assessment   Breast Size Issue none   Breast Shape Bilateral:;pendulous   Breast Density Bilateral:;soft   Areola Bilateral:;elastic   Nipple(s) Bilateral:;everted   Infant Assessment   Sucking Reflex present   Rooting Reflex present   Swallow Reflex present   LATCH Score   Latch 1-->repeated attempts, holds nipple in mouth, stimulate to suck   Audible Swallowing 2-->spontaneous and intermittent (24 hrs old)   Type Of Nipple 2-->everted (after stimulation)   Comfort (Breast/Nipple) 2-->soft/nontender   Hold (Positioning) 1-->minimal assist, teach one side: mother does other, staff holds   Score (less than 7 for 2/more consecutive times, consult Lactation Consultant) 8   Breasts WDL   Breasts WDL WDL       Number Scale   Presence of Pain denies   Location nipple(s)   Maternal Infant Feeding   Maternal Emotional State assist needed   Infant Positioning cross-cradle   Signs of Milk Transfer audible swallow;infant jaw motion present   Time Spent (min) 15-30 min   Latch Assistance yes   Breastfeeding Education adequate infant intake;importance of skin-to-skin contact;milk expression, hand  (breast compression for better milk transfer, positioning)   Breastfeeding History   Currently Breastfeeding yes   Feeding Infant   Feeding Readiness Cues rooting   Feeding Tolerance/Success adequate pause for breath;coordinated suck;coordinated swallow;rooting   Effective Latch During Feeding yes   Skin-to-Skin Contact During Feeding yes   Lactation Referrals   Lactation Consult Breastfeeding assessment;Knowledge deficit   Lactation Interventions   Attachment Promotion counseling provided;breastfeeding assistance provided;skin-to-skin contact encouraged   Breast Care: Breastfeeding milk massaged towards nipple  (adjusted for deeper latch- mid breast hold durign breast com)   Breastfeeding Assistance assisted with positioning;feeding cue recognition promoted;feeding on demand promoted;feeding session  observed;infant suck/swallow verified   Maternal Breastfeeding Support encouragement offered;lactation counseling provided   Latch Promotion positioning assisted;suck stimulated with colostrum drop;infant's mouth opened gently   Seen pt for breastfeeding assessment and counseling.  As we prepare for breastfeeding  at room offered a towel to wipe the nipple, but taught her that she doesn't need to clean nipple every prior feeding.  Pt acknowledges, taught how to use breast milk on the nipple instead, so taught hand expression.  Helped with cross cradle hold position.  Taught pt how to facilitate a deeper latch by correct sandwich hold on the breast and bringing baby closer to the breast.  Taught breast compression and gentle infant stroke to initiate active nursing.  Baby nursed well on right breast.  Baby's dad was very supportive of breastfeeding and encourages the pt to continue breastfeeding. Encourage to continue breastfeed baby 8x or more in 24 hours, on cue feeding; chest to chest position, chin to breast latch. Discussed the use of breastfeeding manual. Praised the mom for joyfully nursing her baby. Gave lactation warm line and encouraged to call if she needs further assistance.

## 2017-08-27 NOTE — ASSESSMENT & PLAN NOTE
Postpartum care:  - Patient doing well. Continue routine management and advances.  - Continue PO pain meds. Pain well controlled.  - Encourage ambulation.   - Heme: Pre Delivery h/h 12/38 --> Post Delivery h/h 8/25  - Circumcision - Consents signed and order placed  - Contraception - discuss with primary OBG  - Lactation nurse following along PRN  - Rh Status - POS

## 2017-08-27 NOTE — ANESTHESIA POSTPROCEDURE EVALUATION
"Anesthesia Post Evaluation    Patient: Tere Peña    Procedure(s) Performed: Procedure(s) (LRB):  DELIVERY- SECTION (N/A)    Final Anesthesia Type: epidural  Patient location during evaluation: labor & delivery  Patient participation: Yes- Able to Participate  Level of consciousness: awake and alert and oriented  Post-procedure vital signs: reviewed and stable  Pain management: adequate  Airway patency: patent  PONV status at discharge: No PONV  Anesthetic complications: no      Cardiovascular status: blood pressure returned to baseline, hemodynamically stable and stable  Respiratory status: unassisted, spontaneous ventilation and room air  Hydration status: euvolemic  Follow-up not needed.        Visit Vitals  /69 (Patient Position: Sitting)   Pulse 90   Temp 36.4 °C (97.5 °F) (Oral)   Resp 18   Ht 5' 4" (1.626 m)   Wt 78.5 kg (173 lb 1 oz)   LMP  (LMP Unknown)   SpO2 97%   Breastfeeding? Yes   BMI 29.71 kg/m²       Pain/Jasmeet Score: Pain Rating Prior to Med Admin: 9 (2017 11:49 AM)  Pain Rating Post Med Admin: 5 (2017  5:26 AM)      "

## 2017-08-27 NOTE — PROGRESS NOTES
[facility]  Obstetrics  Postpartum Progress Note    Patient Name: Tere Peña  MRN: [id number]  Admission Date: 2017  Hospital Length of Stay: 2 days  Attending Physician: Maya Falcon MD  Primary Care Provider: Primary Doctor No    Subjective:     Principal Problem: delivery delivered    Hospital course: 2017 - admitted for labor  2017 - Arrest of dilation -> primary low transverse c section, EBL 1930  2017 POD#1, meeting postoperative milestones    Interval History: She is doing well this morning. She is tolerating a regular diet without nausea or vomiting. She is voiding spontaneously. She is ambulating. She has passed flatus, and has a BM. Vaginal bleeding is mild. She denies fever or chills. Abdominal pain is moderate and controlled with oral medications. She desires circumcision for her male baby: yes.    Objective:     Vital Signs (Most Recent):  Temp: 99.2 °F (37.3 °C) (17 0447)  Pulse: 91 (17 044)  Resp: 14 (17)  BP: 110/62 (17)  SpO2: 96 % (177) Vital Signs (24h Range):  Temp:  [98.4 °F (36.9 °C)-99.6 °F (37.6 °C)] 99.2 °F (37.3 °C)  Pulse:  [] 91  Resp:  [14-20] 14  SpO2:  [96 %-98 %] 96 %  BP: ()/(53-62) 110/62     Weight: 78.5 kg (173 lb 1 oz)  Body mass index is 29.71 kg/m².      Intake/Output Summary (Last 24 hours) at 17 0937  Last data filed at 17   Gross per 24 hour   Intake                0 ml   Output             1525 ml   Net            -1525 ml       Significant Labs:  Lab Results   Component Value Date    GROUPTRH B POS 2017    HEPBSAG Negative 2017    STREPBCULT No Group B Streptococcus isolated 2017       Recent Labs  Lab 17  0543   HGB 8.4*   HCT 25.1*       I have personallly reviewed all pertinent lab results from the last 24 hours.    Physical Exam:   Constitutional: She is oriented to person, place, and time. She appears well-developed and  well-nourished. No distress.    HENT:   Head: Normocephalic and atraumatic.       Pulmonary/Chest: Effort normal. No respiratory distress.        Abdominal: Soft. She exhibits abdominal incision (dressing in place w minimal shadowing). She exhibits no distension. There is no tenderness. There is no rebound and no guarding.                 Neurological: She is alert and oriented to person, place, and time.    Skin: Skin is warm and dry. She is not diaphoretic.    Psychiatric: She has a normal mood and affect.       Assessment/Plan:     32 y.o. female  for:    Acute blood loss anemia    Last H&H: .4/.1  Fe/colace        Indication for care in labor and delivery, antepartum    - Consents signed and to chart  - Admit to Labor and Delivery unit  - Plan for active labor management    - Epidural per Anesthesia  - Draw CBC, T&S  - Notify Staff  - Ultrasound performed, fetus in vertex position.  - Recheck in 2 hrs or PRN  - Post-Partum Hemorrhage risk - low          *  delivery delivered    Postpartum care:  - Patient doing well. Continue routine management and advances.  - Continue PO pain meds. Pain well controlled.  - Encourage ambulation.   - Heme: Pre Delivery h/h  --> Post Delivery h/h   - Circumcision - Consents signed and order placed  - Contraception - discuss with primary OBG  - Lactation nurse following along PRN  - Rh Status - POS                Disposition: As patient meets milestones, will plan to discharge POD#2-4.    Natalio Sutton MD  Obstetrics  [facility]

## 2017-08-27 NOTE — PLAN OF CARE
Problem: Patient Care Overview  Goal: Plan of Care Review  Outcome: Ongoing (interventions implemented as appropriate)  VSS, no acute distress or discomfort overnight. Ambulating , voiding, and passing flatulence. Pain well controlled. Breastfeeding infant well.

## 2017-08-27 NOTE — PLAN OF CARE
Problem: Patient Care Overview  Goal: Plan of Care Review  Outcome: Ongoing (interventions implemented as appropriate)  Continue breastfeeding 8x or more in 24 hours, on cue feeding; chest to chest position, chin to breast latch. Breast compression for better milk transfer.

## 2017-08-27 NOTE — SUBJECTIVE & OBJECTIVE
Hospital course: 08/25/2017 - admitted for labor  8/26/2017 - Arrest of dilation -> primary low transverse c section, EBL 1930  08/27/2017 POD#1, meeting postoperative milestones    Interval History: She is doing well this morning. She is tolerating a regular diet without nausea or vomiting. She is voiding spontaneously. She is ambulating. She has passed flatus, and has a BM. Vaginal bleeding is mild. She denies fever or chills. Abdominal pain is moderate and controlled with oral medications. She desires circumcision for her male baby: yes.    Objective:     Vital Signs (Most Recent):  Temp: 99.2 °F (37.3 °C) (08/27/17 0447)  Pulse: 91 (08/27/17 0447)  Resp: 14 (08/27/17 0447)  BP: 110/62 (08/27/17 0447)  SpO2: 96 % (08/27/17 0447) Vital Signs (24h Range):  Temp:  [98.4 °F (36.9 °C)-99.6 °F (37.6 °C)] 99.2 °F (37.3 °C)  Pulse:  [] 91  Resp:  [14-20] 14  SpO2:  [96 %-98 %] 96 %  BP: ()/(53-62) 110/62     Weight: 78.5 kg (173 lb 1 oz)  Body mass index is 29.71 kg/m².      Intake/Output Summary (Last 24 hours) at 08/27/17 0937  Last data filed at 08/26/17 2020   Gross per 24 hour   Intake                0 ml   Output             1525 ml   Net            -1525 ml       Significant Labs:  Lab Results   Component Value Date    GROUPTRH B POS 08/25/2017    HEPBSAG Negative 01/03/2017    STREPBCULT No Group B Streptococcus isolated 08/03/2017       Recent Labs  Lab 08/27/17  0543   HGB 8.4*   HCT 25.1*       I have personallly reviewed all pertinent lab results from the last 24 hours.    Physical Exam:   Constitutional: She is oriented to person, place, and time. She appears well-developed and well-nourished. No distress.    HENT:   Head: Normocephalic and atraumatic.       Pulmonary/Chest: Effort normal. No respiratory distress.        Abdominal: Soft. She exhibits abdominal incision (dressing in place w minimal shadowing). She exhibits no distension. There is no tenderness. There is no rebound and no guarding.                  Neurological: She is alert and oriented to person, place, and time.    Skin: Skin is warm and dry. She is not diaphoretic.    Psychiatric: She has a normal mood and affect.

## 2017-08-27 NOTE — PLAN OF CARE
Problem: Fall Risk (Adult)  Goal: Identify Related Risk Factors and Signs and Symptoms  Related risk factors and signs and symptoms are identified upon initiation of Human Response Clinical Practice Guideline (CPG)   Outcome: Ongoing (interventions implemented as appropriate)  NO signs or symptoms of dizziness or blurry vision. Patient able to ambulate without assistance.

## 2017-08-28 PROBLEM — D62 ACUTE BLOOD LOSS ANEMIA: Status: ACTIVE | Noted: 2017-08-28

## 2017-08-28 LAB
ALLENS TEST: ABNORMAL
HCO3 UR-SCNC: 25.1 MMOL/L (ref 24–28)
PCO2 BLDA: 61.3 MMHG (ref 35–45)
PH SMN: 7.22 [PH] (ref 7.35–7.45)
PO2 BLDA: <5 MMHG (ref 80–100)
POC BE: -3 MMOL/L
POC SATURATED O2: ABNORMAL %
SAMPLE: ABNORMAL
SITE: ABNORMAL

## 2017-08-28 PROCEDURE — 99231 SBSQ HOSP IP/OBS SF/LOW 25: CPT | Mod: ,,, | Performed by: OBSTETRICS & GYNECOLOGY

## 2017-08-28 PROCEDURE — 72100003 HC LABOR CARE, EA. ADDL. 8 HRS

## 2017-08-28 PROCEDURE — 11000001 HC ACUTE MED/SURG PRIVATE ROOM

## 2017-08-28 PROCEDURE — 25000003 PHARM REV CODE 250: Performed by: OBSTETRICS & GYNECOLOGY

## 2017-08-28 RX ADMIN — Medication 150 MG: at 09:08

## 2017-08-28 RX ADMIN — OXYCODONE HYDROCHLORIDE AND ACETAMINOPHEN 1 TABLET: 5; 325 TABLET ORAL at 06:08

## 2017-08-28 RX ADMIN — IBUPROFEN 600 MG: 600 TABLET, FILM COATED ORAL at 05:08

## 2017-08-28 RX ADMIN — IBUPROFEN 600 MG: 600 TABLET, FILM COATED ORAL at 01:08

## 2017-08-28 RX ADMIN — IBUPROFEN 600 MG: 600 TABLET, FILM COATED ORAL at 06:08

## 2017-08-28 RX ADMIN — OXYCODONE HYDROCHLORIDE AND ACETAMINOPHEN 1 TABLET: 10; 325 TABLET ORAL at 11:08

## 2017-08-28 RX ADMIN — IBUPROFEN 600 MG: 600 TABLET, FILM COATED ORAL at 12:08

## 2017-08-28 RX ADMIN — OXYCODONE HYDROCHLORIDE AND ACETAMINOPHEN 1 TABLET: 10; 325 TABLET ORAL at 01:08

## 2017-08-28 RX ADMIN — IBUPROFEN 600 MG: 600 TABLET, FILM COATED ORAL at 11:08

## 2017-08-28 RX ADMIN — DOCUSATE SODIUM 200 MG: 100 CAPSULE, LIQUID FILLED ORAL at 08:08

## 2017-08-28 RX ADMIN — OXYCODONE HYDROCHLORIDE AND ACETAMINOPHEN 1 TABLET: 10; 325 TABLET ORAL at 06:08

## 2017-08-28 RX ADMIN — DOCUSATE SODIUM 200 MG: 100 CAPSULE, LIQUID FILLED ORAL at 09:08

## 2017-08-28 NOTE — PLAN OF CARE
Problem: Patient Care Overview  Goal: Plan of Care Review  Outcome: Ongoing (interventions implemented as appropriate)  Pt to breastfeed with sns and to pump after feedings for 15 minutes. Pt to observed for signs of milk transfer.

## 2017-08-28 NOTE — LACTATION NOTE
"   08/28/17 1645   Maternal Infant Assessment   Breast Shape Bilateral:;pendulous   Breast Density Bilateral:;soft   Areola Bilateral:;elastic   Nipple(s) Bilateral:;everted   Infant Assessment   Sucking Reflex present   Rooting Reflex present   Swallow Reflex present   LATCH Score   Latch 2-->grasps breast, tongue down, lips flanged, rhythmic sucking   Audible Swallowing 2-->spontaneous and intermittent (24 hrs old)   Type Of Nipple 2-->everted (after stimulation)   Comfort (Breast/Nipple) 2-->soft/nontender   Hold (Positioning) 1-->minimal assist, teach one side: mother does other, staff holds   Score (less than 7 for 2/more consecutive times, consult Lactation Consultant) 9   Maternal Infant Feeding   Maternal Emotional State relaxed;assist needed   Infant Positioning clutch/"football"   Signs of Milk Transfer audible swallow   Time Spent (min) 15-30 min   Breastfeeding History   Currently Breastfeeding yes   Feeding Infant   Feeding Readiness Cues eager;rooting   Effective Latch During Feeding yes   Audible Swallow yes   Suck/Swallow Coordination present   Equipment Type/Education   Pump Type Symphony   Breast Pump Type double electric, hospital grade   Breast Pump Flange Type hard   Breast Pump Flange Size 27 mm   Breast Pumping Bilateral Breasts:   Pumping Frequency (times) (after nursing preemie plus setting)   Lactation Referrals   Lactation Consult Breastfeeding assessment;Follow up;Pump teaching  (sns teaching)   Lactation Interventions   Attachment Promotion breastfeeding assistance provided;counseling provided;family involvement promoted;skin-to-skin contact encouraged   Breastfeeding Assistance assisted with positioning;feeding cue recognition promoted;infant latch-on verified;infant suck/swallow verified;support offered;supplemental feeding provided  (sns used)   Maternal Breastfeeding Support lactation counseling provided   Latch Promotion positioning assisted;infant moved to breast;suck stimulated " with colostrum drop   assisted pt with use of sns at breast. Baby latched easily to breast. Baby actively nursed and 30 mls of formula was removed from sns. Encouraged mom to nurse baby using sns and pump on preemie plus setting for 15 minutes. Breastfeeding education given. Questions answered.

## 2017-08-28 NOTE — SUBJECTIVE & OBJECTIVE
Hospital course: 08/25/2017 - admitted for labor  8/26/2017 - Arrest of dilation -> primary low transverse c section, EBL 1930  08/27/2017 POD#1, meeting postoperative milestones  08/28/2017 POD#2 s/p LTCS, meeting post  Op milestones (ambulating, tolerating Po, both breast and bottle feeding).    Interval History: She is doing well this morning. She is tolerating a regular diet without nausea or vomiting. She is voiding spontaneously. She is ambulating. She has passed flatus, and has not had a BM. Vaginal bleeding is mild. She denies fever or chills. Abdominal pain is moderate and controlled with oral medications. She desires circumcision for her male baby: yes.    Objective:     Vital Signs (Most Recent):  Temp: 98.2 °F (36.8 °C) (08/28/17 0010)  Pulse: 80 (08/28/17 0010)  Resp: 20 (08/28/17 0010)  BP: 118/82 (08/28/17 0010)  SpO2: 98 % (08/28/17 0010) Vital Signs (24h Range):  Temp:  [97.5 °F (36.4 °C)-98.2 °F (36.8 °C)] 98.2 °F (36.8 °C)  Pulse:  [80-90] 80  Resp:  [18-20] 20  SpO2:  [97 %-98 %] 98 %  BP: (116-118)/(69-82) 118/82     Weight: 78.5 kg (173 lb 1 oz)  Body mass index is 29.71 kg/m².    No intake or output data in the 24 hours ending 08/28/17 0615    Significant Labs:  Lab Results   Component Value Date    GROUPTRH B POS 08/25/2017    HEPBSAG Negative 01/03/2017    STREPBCULT No Group B Streptococcus isolated 08/03/2017       Recent Labs  Lab 08/27/17  0543   HGB 8.4*   HCT 25.1*       I have personallly reviewed all pertinent lab results from the last 24 hours.    Physical Exam:   Constitutional: She is oriented to person, place, and time. She appears well-developed and well-nourished. No distress.    HENT:   Head: Normocephalic and atraumatic.       Pulmonary/Chest: Effort normal. No respiratory distress.        Abdominal: Soft. She exhibits abdominal incision (LTCS c/d/i no soi). She exhibits no distension. There is no tenderness. There is no rebound and no guarding.   Fundus firm below  umbilicus                   Neurological: She is alert and oriented to person, place, and time.    Skin: Skin is warm and dry. She is not diaphoretic.    Psychiatric: She has a normal mood and affect.

## 2017-08-28 NOTE — PROGRESS NOTES
Pt started on electric breast pump. Pt educated on how to use breast pump and how to clean it. Pt also requesting a bottle of formula to supplement breastfeeding. Pt educated on the risks of formula and would still like to give the bottle to the infant. Paced bottle feeding sheet given to patient.

## 2017-08-28 NOTE — PLAN OF CARE
Problem: Patient Care Overview  Goal: Plan of Care Review  Outcome: Ongoing (interventions implemented as appropriate)  Pt stable throughout night, vs wnl for patient. Pain managed with prn pain meds and nonpharmacological measures. Family at bedside offering support and assistance.

## 2017-08-28 NOTE — PROGRESS NOTES
[facility]  Obstetrics  Postpartum Progress Note    Patient Name: Tere Peña  MRN: [id number]  Admission Date: 2017  Hospital Length of Stay: 3 days  Attending Physician: Maya Falcon MD  Primary Care Provider: Primary Doctor No    Subjective:     Principal Problem: delivery delivered    Hospital course: 2017 - admitted for labor  2017 - Arrest of dilation -> primary low transverse c section, EBL 1930  2017 POD#1, meeting postoperative milestones  2017 POD#2 s/p LTCS, meeting post  Op milestones (ambulating, tolerating Po, both breast and bottle feeding).    Interval History: She is doing well this morning. She is tolerating a regular diet without nausea or vomiting. She is voiding spontaneously. She is ambulating. She has passed flatus, and has not had a BM. Vaginal bleeding is mild. She denies fever or chills. Abdominal pain is moderate and controlled with oral medications. She desires circumcision for her male baby: yes, baby still under bili light.    Objective:     Vital Signs (Most Recent):  Temp: 98.2 °F (36.8 °C) (17 0010)  Pulse: 80 (17 0010)  Resp: 20 (17 0010)  BP: 118/82 (17 0010)  SpO2: 98 % (17 0010) Vital Signs (24h Range):  Temp:  [97.5 °F (36.4 °C)-98.2 °F (36.8 °C)] 98.2 °F (36.8 °C)  Pulse:  [80-90] 80  Resp:  [18-20] 20  SpO2:  [97 %-98 %] 98 %  BP: (116-118)/(69-82) 118/82     Weight: 78.5 kg (173 lb 1 oz)  Body mass index is 29.71 kg/m².    No intake or output data in the 24 hours ending 17 0615    Significant Labs:  Lab Results   Component Value Date    GROUPTRH B POS 2017    HEPBSAG Negative 2017    STREPBCULT No Group B Streptococcus isolated 2017       Recent Labs  Lab 17  0543   HGB 8.4*   HCT 25.1*       I have personallly reviewed all pertinent lab results from the last 24 hours.    Physical Exam:   Constitutional: She is oriented to person, place, and time. She appears  well-developed and well-nourished. No distress.    HENT:   Head: Normocephalic and atraumatic.       Pulmonary/Chest: Effort normal. No respiratory distress.        Abdominal: Soft. She exhibits abdominal incision (LTCS c/d/i no soi). She exhibits no distension. There is no tenderness. There is no rebound and no guarding.   Fundus firm below umbilicus                   Neurological: She is alert and oriented to person, place, and time.    Skin: Skin is warm and dry. She is not diaphoretic.    Psychiatric: She has a normal mood and affect.       Assessment/Plan:     32 y.o. female  for:    Acute blood loss anemia    Last H&H: 8.4/25.1  Fe/colace        *  delivery delivered    Postpartum care:  - Patient doing well. Continue routine management and advances.  - Continue PO pain meds. Pain well controlled.  - Encourage ambulation.   - Heme: Pre Delivery h/h  --> Post Delivery h/h   - Circumcision - Consents signed and order placed  - Contraception - discuss with primary OBG  - Lactation nurse following along PRN  - Rh Status - POS                Disposition: As patient meets milestones, will plan to discharge on POD#3.    Nasra Galindo MD  Obstetrics  [facility]

## 2017-08-28 NOTE — PLAN OF CARE
Problem: Patient Care Overview  Goal: Plan of Care Review  Outcome: Ongoing (interventions implemented as appropriate)  Pt. vitals stable. Pt. pain controlled on po meds. Pt spontaneously voiding and ambulating independently.

## 2017-08-29 ENCOUNTER — NURSE TRIAGE (OUTPATIENT)
Dept: ADMINISTRATIVE | Facility: CLINIC | Age: 32
End: 2017-08-29

## 2017-08-29 VITALS
DIASTOLIC BLOOD PRESSURE: 73 MMHG | HEART RATE: 93 BPM | WEIGHT: 173.06 LBS | SYSTOLIC BLOOD PRESSURE: 126 MMHG | TEMPERATURE: 98 F | BODY MASS INDEX: 29.55 KG/M2 | RESPIRATION RATE: 18 BRPM | HEIGHT: 64 IN | OXYGEN SATURATION: 96 %

## 2017-08-29 PROCEDURE — 99238 HOSP IP/OBS DSCHRG MGMT 30/<: CPT | Mod: ,,, | Performed by: OBSTETRICS & GYNECOLOGY

## 2017-08-29 PROCEDURE — 25000003 PHARM REV CODE 250: Performed by: OBSTETRICS & GYNECOLOGY

## 2017-08-29 RX ORDER — IBUPROFEN 600 MG/1
600 TABLET ORAL EVERY 6 HOURS PRN
Qty: 60 TABLET | Refills: 3 | Status: SHIPPED | OUTPATIENT
Start: 2017-08-29

## 2017-08-29 RX ORDER — OXYCODONE AND ACETAMINOPHEN 5; 325 MG/1; MG/1
1 TABLET ORAL EVERY 4 HOURS PRN
Qty: 20 TABLET | Refills: 0 | Status: SHIPPED | OUTPATIENT
Start: 2017-08-29

## 2017-08-29 RX ADMIN — IBUPROFEN 600 MG: 600 TABLET, FILM COATED ORAL at 06:08

## 2017-08-29 RX ADMIN — Medication 150 MG: at 08:08

## 2017-08-29 RX ADMIN — DOCUSATE SODIUM 200 MG: 100 CAPSULE, LIQUID FILLED ORAL at 08:08

## 2017-08-29 RX ADMIN — OXYCODONE HYDROCHLORIDE AND ACETAMINOPHEN 1 TABLET: 5; 325 TABLET ORAL at 06:08

## 2017-08-29 NOTE — PROGRESS NOTES
[facility]  Obstetrics  Postpartum Progress Note    Patient Name: Tere Peña  MRN: [id number]  Admission Date: 2017  Hospital Length of Stay: 4 days  Attending Physician: Maya Falcon MD  Primary Care Provider: Primary Doctor No    Subjective:     Principal Problem: delivery delivered    Hospital course: 2017 - admitted for labor  2017 - Arrest of dilation -> primary low transverse c section, EBL 1930  2017 POD#1, meeting postoperative milestones  2017 POD#2 s/p LTCS, meeting post  Op milestones (ambulating, tolerating Po, both breast and bottle feeding).  2017 POD#3 doing well, meeting Post op milestones. Desires discharge.    Interval History: She is doing well this morning. She is tolerating a regular diet without nausea or vomiting. She is voiding spontaneously. She is ambulating. She has passed flatus, and has not had a BM. Vaginal bleeding is mild. She denies fever or chills. Abdominal pain is moderate and controlled with oral medications. She desires circumcision for her male baby, which was done yesterday.    Objective:     Vital Signs (Most Recent):  Temp: 97.9 °F (36.6 °C) (17 0000)  Pulse: 89 (17 0000)  Resp: 18 (17 0000)  BP: 116/75 (17 0000)  SpO2: 99 % (17 0000) Vital Signs (24h Range):  Temp:  [97.9 °F (36.6 °C)-98.3 °F (36.8 °C)] 97.9 °F (36.6 °C)  Pulse:  [81-89] 89  Resp:  [18] 18  SpO2:  [97 %-99 %] 99 %  BP: (116-119)/(73-76) 116/75     Weight: 78.5 kg (173 lb 1 oz)  Body mass index is 29.71 kg/m².    No intake or output data in the 24 hours ending 17 0758    Significant Labs:  Lab Results   Component Value Date    GROUPTRH B POS 2017    HEPBSAG Negative 2017    STREPBCULT No Group B Streptococcus isolated 2017     No results for input(s): HGB, HCT in the last 48 hours.    I have personallly reviewed all pertinent lab results from the last 24 hours.    Physical Exam:    Constitutional: She is oriented to person, place, and time. She appears well-developed and well-nourished. No distress.    HENT:   Head: Normocephalic and atraumatic.    Eyes: EOM are normal.    Neck: Normal range of motion.    Cardiovascular: Normal rate.     Pulmonary/Chest: Effort normal. No respiratory distress.        Abdominal: Soft. She exhibits abdominal incision (LTCS c/d/i no soi). She exhibits no distension. There is no tenderness. There is no rebound and no guarding.   Fundus firm below umbilicus                   Neurological: She is alert and oriented to person, place, and time.    Skin: Skin is warm and dry. She is not diaphoretic.    Psychiatric: She has a normal mood and affect.       Assessment/Plan:     32 y.o. female  for:    Acute blood loss anemia    - Last H&H: .1  - Fe/colace  - asx        *  delivery delivered    Postpartum care:  - Patient doing well. Continue routine management and advances.  - Continue PO pain meds. Pain well controlled.  - Encourage ambulation.   - Heme: Pre Delivery h/h  --> Post Delivery h/h   - Circumcision - Consents signed and order placed  - Contraception - discuss with primary OBG  - Lactation nurse following along PRN  - Rh Status - POS                Disposition: As patient meets milestones, will plan to discharge POD#3-4.    Sushma K Reddy, MD  Obstetrics  [facility]

## 2017-08-29 NOTE — PLAN OF CARE
Problem: Patient Care Overview  Goal: Plan of Care Review  Outcome: Ongoing (interventions implemented as appropriate)  Pt stable throughout night. Pain managed with prn and scheduled meds and nonpharmacological measures. Pt resting in bed, appears comfortable. Attends to needs of baby. Family at bedside assisting patient.

## 2017-08-29 NOTE — DISCHARGE SUMMARY
[facility]  Obstetrics  Discharge Summary      Patient Name: Tere Peña  MRN: [id number]  Admission Date: 2017  Hospital Length of Stay: 4 days  Discharge Date and Time:  2017 7:57 AM  Attending Physician: Maya Falcon MD   Discharging Provider: Nasra Galindo MD  Primary Care Provider: Primary Doctor No    HPI: Tere Peña is a 32 y.o. F at 39w6d presents complaining of contractions q3-5 minutes. She was 1-2 cm dilated in clinic today. She also feels that she has been leaking but denies big gush of fluid. She was not found to be ruptured in [place], but cervix changed from 2 to 4 cm.  This IUP is uncomplicated.  Patient reports contractions, denies vaginal bleeding, reports LOF. Fetal Movement: normal.       Procedure(s) (LRB):  DELIVERY- SECTION (N/A)     Hospital Course:   2017 - admitted for labor  2017 - Arrest of dilation -> primary low transverse c section, EBL 1930  2017 POD#1, meeting postoperative milestones  2017 POD#2 s/p LTCS, meeting post  Op milestones (ambulating, tolerating Po, both breast and bottle feeding).  2017 POD#3 doing well, meeting Post op milestones. Desires discharge.        Final Active Diagnoses:    Diagnosis Date Noted POA    PRINCIPAL PROBLEM:   delivery delivered [O82] 2017 No    Acute blood loss anemia [D62] 2017 No      Problems Resolved During this Admission:    Diagnosis Date Noted Date Resolved POA    Indication for care in labor and delivery, antepartum [O75.9] 2017 Yes        Labs: CBC No results for input(s): WBC, HGB, HCT, PLT in the last 48 hours.    Feeding Method: bottle    Immunizations     Date Immunization Status Dose Route/Site Given by    17 05 MMR Incomplete 0.5 mL Subcutaneous/Left deltoid     17 Tdap Incomplete 0.5 mL Intramuscular/Left deltoid           Delivery:    Episiotomy: None   Lacerations: None   Repair suture:     Repair # of  packets: 9   Blood loss (ml): 0     Birth information:  YOB: 2017   Time of birth: 4:02 AM   Sex: male   Delivery type: , Low Transverse   Gestational Age: 40w0d    Delivery Clinician:      Other providers:       Additional  information:  Forceps:    Vacuum:    Breech:    Observed anomalies      Living?:           APGARS  One minute Five minutes Ten minutes   Skin color:         Heart rate:         Grimace:         Muscle tone:         Breathing:         Totals: 6  9        Placenta: Delivered:       appearance    Pending Diagnostic Studies:     None          Discharged Condition: good    Disposition: Home or Self Care    Follow Up:  Follow-up Information     Maya Falcon MD. Schedule an appointment as soon as possible for a visit in 6 weeks.    Specialties:  Obstetrics, Obstetrics and Gynecology  Why:  post partum visit   Contact information:  [address] [address] [phone]                 Patient Instructions:     Diet general     Activity as tolerated     Call MD for:  temperature >100.4     Call MD for:  persistent nausea and vomiting or diarrhea     Call MD for:  redness, tenderness, or signs of infection (pain, swelling, redness, odor or green/yellow discharge around incision site)     Call MD for:  severe uncontrolled pain     Call MD for:  difficulty breathing or increased cough     Call MD for:  severe persistent headache     Call MD for:  persistent dizziness, light-headedness, or visual disturbances     Call MD for:  increased confusion or weakness     Call MD for:   Order Comments: If bleeding through more than 2 pads/1 hour       Medications:  Current Discharge Medication List      START taking these medications    Details   ibuprofen (ADVIL,MOTRIN) 600 MG tablet Take 1 tablet (600 mg total) by mouth every 6 (six) hours as needed for Pain.  Qty: 60 tablet, Refills: 3      oxycodone-acetaminophen (PERCOCET) 5-325 mg per tablet Take 1 tablet by mouth  every 4 (four) hours as needed.  Qty: 20 tablet, Refills: 0         CONTINUE these medications which have NOT CHANGED    Details   ondansetron (ZOFRAN-ODT) 4 MG TbDL Take 2 tablets (8 mg total) by mouth every 8 (eight) hours.  Qty: 60 tablet, Refills: 3      PNV59-iron,carb,fum-FA-DSS-dha (CITRANATAL HARMONY, IRON FUM,) 27 mg iron-1 mg -50 mg-260 mg Cap Take 1 capsule by mouth once daily.  Qty: 30 capsule, Refills: 12             Nasra Galindo MD  Obstetrics  [facility]

## 2017-08-29 NOTE — SUBJECTIVE & OBJECTIVE
Hospital course: 08/25/2017 - admitted for labor  8/26/2017 - Arrest of dilation -> primary low transverse c section, EBL 1930  08/27/2017 POD#1, meeting postoperative milestones  08/28/2017 POD#2 s/p LTCS, meeting post  Op milestones (ambulating, tolerating Po, both breast and bottle feeding).  08/29/2017 POD#3 doing well, meeting Post op milestones. Desires discharge.    Interval History: She is doing well this morning. She is tolerating a regular diet without nausea or vomiting. She is voiding spontaneously. She is ambulating. She has passed flatus, and has not had a BM. Vaginal bleeding is mild. She denies fever or chills. Abdominal pain is moderate and controlled with oral medications. She desires circumcision for her male baby, which was done yesterday.    Objective:     Vital Signs (Most Recent):  Temp: 97.9 °F (36.6 °C) (08/29/17 0000)  Pulse: 89 (08/29/17 0000)  Resp: 18 (08/29/17 0000)  BP: 116/75 (08/29/17 0000)  SpO2: 99 % (08/29/17 0000) Vital Signs (24h Range):  Temp:  [97.9 °F (36.6 °C)-98.3 °F (36.8 °C)] 97.9 °F (36.6 °C)  Pulse:  [81-89] 89  Resp:  [18] 18  SpO2:  [97 %-99 %] 99 %  BP: (116-119)/(73-76) 116/75     Weight: 78.5 kg (173 lb 1 oz)  Body mass index is 29.71 kg/m².    No intake or output data in the 24 hours ending 08/29/17 0758    Significant Labs:  Lab Results   Component Value Date    GROUPTRH B POS 08/25/2017    HEPBSAG Negative 01/03/2017    STREPBCULT No Group B Streptococcus isolated 08/03/2017     No results for input(s): HGB, HCT in the last 48 hours.    I have personallly reviewed all pertinent lab results from the last 24 hours.    Physical Exam:   Constitutional: She is oriented to person, place, and time. She appears well-developed and well-nourished. No distress.    HENT:   Head: Normocephalic and atraumatic.    Eyes: EOM are normal.    Neck: Normal range of motion.    Cardiovascular: Normal rate.     Pulmonary/Chest: Effort normal. No respiratory distress.        Abdominal:  Soft. She exhibits abdominal incision (LTCS c/d/i no soi). She exhibits no distension. There is no tenderness. There is no rebound and no guarding.   Fundus firm below umbilicus                   Neurological: She is alert and oriented to person, place, and time.    Skin: Skin is warm and dry. She is not diaphoretic.    Psychiatric: She has a normal mood and affect.

## 2017-08-29 NOTE — LACTATION NOTE
"   08/29/17 1140   Maternal Infant Assessment   Breast Shape Bilateral:;pendulous   Breast Density Bilateral:;soft   Areola Bilateral:;elastic   Nipple(s) Bilateral:;everted   Infant Assessment   Sucking Reflex present   Rooting Reflex present   Swallow Reflex present   LATCH Score   Latch 2-->grasps breast, tongue down, lips flanged, rhythmic sucking   Audible Swallowing 2-->spontaneous and intermittent (24 hrs old)   Type Of Nipple 2-->everted (after stimulation)   Comfort (Breast/Nipple) 2-->soft/nontender   Hold (Positioning) 1-->minimal assist, teach one side: mother does other, staff holds   Score (less than 7 for 2/more consecutive times, consult Lactation Consultant) 9   Maternal Infant Feeding   Maternal Emotional State assist needed   Infant Positioning clutch/"football"   Signs of Milk Transfer audible swallow   Time Spent (min) 15-30 min   Latch Assistance yes   Breastfeeding History   Currently Breastfeeding yes   Feeding Infant   Feeding Readiness Cues eager;rooting   Audible Swallow yes   Suck/Swallow Coordination present   Equipment Type/Education   Pump Type Symphony   Breast Pump Type double electric, hospital grade   Breast Pump Flange Type hard   Breast Pump Flange Size 27 mm   Lactation Referrals   Lactation Consult Breastfeeding assessment   Lactation Interventions   Attachment Promotion breastfeeding assistance provided;counseling provided;skin-to-skin contact encouraged   Breastfeeding Assistance assisted with positioning;feeding cue recognition promoted;infant latch-on verified;infant suck/swallow verified;support offered   Maternal Breastfeeding Support lactation counseling provided   Latch Promotion positioning assisted;infant moved to breast   breastfeeding education given. Questions answered. Encouraged pt to breastfeed baby on cue, observe for signs of milk transfer and to pump 3 times daily for 15 minutes for extra stim.baby latches to breast easily. Rhythmic sucking observed. Pt has " lactation contact number.

## 2017-08-30 ENCOUNTER — HOSPITAL ENCOUNTER (INPATIENT)
Facility: OTHER | Age: 32
LOS: 1 days | Discharge: HOME | DRG: 776 | End: 2017-08-31
Attending: EMERGENCY MEDICINE | Admitting: OBSTETRICS & GYNECOLOGY
Payer: MEDICAID

## 2017-08-30 DIAGNOSIS — I83.893 VARICOSE VEINS OF LEG WITH SWELLING, BILATERAL: ICD-10-CM

## 2017-08-30 PROBLEM — O14.90 PRE-ECLAMPSIA: Status: ACTIVE | Noted: 2017-08-30

## 2017-08-30 LAB
ALBUMIN SERPL BCP-MCNC: 2.5 G/DL
ALP SERPL-CCNC: 136 U/L
ALT SERPL W/O P-5'-P-CCNC: 73 U/L
ANION GAP SERPL CALC-SCNC: 8 MMOL/L
AST SERPL-CCNC: 81 U/L
BACTERIA #/AREA URNS AUTO: NORMAL /HPF
BASOPHILS # BLD AUTO: 0.01 K/UL
BASOPHILS NFR BLD: 0.1 %
BILIRUB SERPL-MCNC: 0.3 MG/DL
BILIRUB UR QL STRIP: NEGATIVE
BUN SERPL-MCNC: 14 MG/DL
CALCIUM SERPL-MCNC: 8.5 MG/DL
CHLORIDE SERPL-SCNC: 109 MMOL/L
CLARITY UR REFRACT.AUTO: CLEAR
CO2 SERPL-SCNC: 23 MMOL/L
COLOR UR AUTO: ABNORMAL
CREAT SERPL-MCNC: 0.6 MG/DL
CREAT UR-MCNC: 13.1 MG/DL
DIFFERENTIAL METHOD: ABNORMAL
EOSINOPHIL # BLD AUTO: 0.2 K/UL
EOSINOPHIL NFR BLD: 2.5 %
ERYTHROCYTE [DISTWIDTH] IN BLOOD BY AUTOMATED COUNT: 13.2 %
EST. GFR  (AFRICAN AMERICAN): >60 ML/MIN/1.73 M^2
EST. GFR  (NON AFRICAN AMERICAN): >60 ML/MIN/1.73 M^2
GLUCOSE SERPL-MCNC: 94 MG/DL
GLUCOSE UR QL STRIP: NEGATIVE
HCT VFR BLD AUTO: 28.1 %
HGB BLD-MCNC: 9.7 G/DL
HGB UR QL STRIP: ABNORMAL
KETONES UR QL STRIP: NEGATIVE
LEUKOCYTE ESTERASE UR QL STRIP: NEGATIVE
LYMPHOCYTES # BLD AUTO: 1.4 K/UL
LYMPHOCYTES NFR BLD: 16 %
MAGNESIUM SERPL-MCNC: 1.8 MG/DL
MCH RBC QN AUTO: 29.1 PG
MCHC RBC AUTO-ENTMCNC: 34.5 G/DL
MCV RBC AUTO: 84 FL
MICROSCOPIC COMMENT: NORMAL
MONOCYTES # BLD AUTO: 0.5 K/UL
MONOCYTES NFR BLD: 5.6 %
NEUTROPHILS # BLD AUTO: 6.6 K/UL
NEUTROPHILS NFR BLD: 74.8 %
NITRITE UR QL STRIP: NEGATIVE
PH UR STRIP: 7 [PH] (ref 5–8)
PLATELET # BLD AUTO: 275 K/UL
PMV BLD AUTO: 9 FL
POTASSIUM SERPL-SCNC: 4.3 MMOL/L
PROT SERPL-MCNC: 6.1 G/DL
PROT UR QL STRIP: NEGATIVE
PROT UR-MCNC: <7 MG/DL
PROT/CREAT RATIO, UR: ABNORMAL
RBC # BLD AUTO: 3.33 M/UL
RBC #/AREA URNS AUTO: 2 /HPF (ref 0–4)
SODIUM SERPL-SCNC: 140 MMOL/L
SP GR UR STRIP: 1.01 (ref 1–1.03)
SQUAMOUS #/AREA URNS AUTO: 1 /HPF
URN SPEC COLLECT METH UR: ABNORMAL
UROBILINOGEN UR STRIP-ACNC: NEGATIVE EU/DL
WBC # BLD AUTO: 8.8 K/UL
WBC #/AREA URNS AUTO: 1 /HPF (ref 0–5)

## 2017-08-30 PROCEDURE — 93005 ELECTROCARDIOGRAM TRACING: CPT

## 2017-08-30 PROCEDURE — 84156 ASSAY OF PROTEIN URINE: CPT

## 2017-08-30 PROCEDURE — 96368 THER/DIAG CONCURRENT INF: CPT

## 2017-08-30 PROCEDURE — 25000003 PHARM REV CODE 250: Performed by: OBSTETRICS & GYNECOLOGY

## 2017-08-30 PROCEDURE — 85025 COMPLETE CBC W/AUTO DIFF WBC: CPT

## 2017-08-30 PROCEDURE — 99221 1ST HOSP IP/OBS SF/LOW 40: CPT | Mod: ,,, | Performed by: OBSTETRICS & GYNECOLOGY

## 2017-08-30 PROCEDURE — 83735 ASSAY OF MAGNESIUM: CPT

## 2017-08-30 PROCEDURE — 11000001 HC ACUTE MED/SURG PRIVATE ROOM

## 2017-08-30 PROCEDURE — 63600175 PHARM REV CODE 636 W HCPCS: Performed by: OBSTETRICS & GYNECOLOGY

## 2017-08-30 PROCEDURE — 99291 CRITICAL CARE FIRST HOUR: CPT | Mod: ,,, | Performed by: EMERGENCY MEDICINE

## 2017-08-30 PROCEDURE — 80053 COMPREHEN METABOLIC PANEL: CPT

## 2017-08-30 PROCEDURE — 99284 EMERGENCY DEPT VISIT MOD MDM: CPT | Mod: 25

## 2017-08-30 PROCEDURE — 96365 THER/PROPH/DIAG IV INF INIT: CPT

## 2017-08-30 PROCEDURE — 81001 URINALYSIS AUTO W/SCOPE: CPT

## 2017-08-30 RX ORDER — HYDROCODONE BITARTRATE AND ACETAMINOPHEN 5; 325 MG/1; MG/1
1 TABLET ORAL EVERY 4 HOURS PRN
Status: DISCONTINUED | OUTPATIENT
Start: 2017-08-30 | End: 2017-08-31 | Stop reason: HOSPADM

## 2017-08-30 RX ORDER — ACETAMINOPHEN 325 MG/1
650 TABLET ORAL EVERY 6 HOURS PRN
Status: DISCONTINUED | OUTPATIENT
Start: 2017-08-30 | End: 2017-08-31 | Stop reason: HOSPADM

## 2017-08-30 RX ORDER — CALCIUM GLUCONATE 98 MG/ML
1 INJECTION, SOLUTION INTRAVENOUS
Status: DISCONTINUED | OUTPATIENT
Start: 2017-08-30 | End: 2017-08-31 | Stop reason: HOSPADM

## 2017-08-30 RX ORDER — DIPHENHYDRAMINE HCL 25 MG
25 CAPSULE ORAL EVERY 4 HOURS PRN
Status: DISCONTINUED | OUTPATIENT
Start: 2017-08-30 | End: 2017-08-31 | Stop reason: HOSPADM

## 2017-08-30 RX ORDER — IBUPROFEN 600 MG/1
600 TABLET ORAL EVERY 6 HOURS
Status: DISCONTINUED | OUTPATIENT
Start: 2017-08-30 | End: 2017-08-31 | Stop reason: HOSPADM

## 2017-08-30 RX ORDER — MAGNESIUM SULFATE HEPTAHYDRATE 40 MG/ML
2 INJECTION, SOLUTION INTRAVENOUS ONCE
Status: COMPLETED | OUTPATIENT
Start: 2017-08-30 | End: 2017-08-30

## 2017-08-30 RX ORDER — HYDROCORTISONE 25 MG/G
CREAM TOPICAL 3 TIMES DAILY PRN
Status: DISCONTINUED | OUTPATIENT
Start: 2017-08-30 | End: 2017-08-31 | Stop reason: HOSPADM

## 2017-08-30 RX ORDER — HYDROCODONE BITARTRATE AND ACETAMINOPHEN 7.5; 325 MG/1; MG/1
1 TABLET ORAL EVERY 4 HOURS PRN
Status: DISCONTINUED | OUTPATIENT
Start: 2017-08-30 | End: 2017-08-31 | Stop reason: HOSPADM

## 2017-08-30 RX ORDER — DOCUSATE SODIUM 100 MG/1
200 CAPSULE, LIQUID FILLED ORAL 2 TIMES DAILY PRN
Status: DISCONTINUED | OUTPATIENT
Start: 2017-08-30 | End: 2017-08-31 | Stop reason: HOSPADM

## 2017-08-30 RX ORDER — ONDANSETRON 8 MG/1
8 TABLET, ORALLY DISINTEGRATING ORAL EVERY 8 HOURS PRN
Status: DISCONTINUED | OUTPATIENT
Start: 2017-08-30 | End: 2017-08-31 | Stop reason: HOSPADM

## 2017-08-30 RX ORDER — MAGNESIUM SULFATE HEPTAHYDRATE 40 MG/ML
2 INJECTION, SOLUTION INTRAVENOUS CONTINUOUS
Status: DISCONTINUED | OUTPATIENT
Start: 2017-08-30 | End: 2017-08-31

## 2017-08-30 RX ORDER — SODIUM CHLORIDE, SODIUM LACTATE, POTASSIUM CHLORIDE, CALCIUM CHLORIDE 600; 310; 30; 20 MG/100ML; MG/100ML; MG/100ML; MG/100ML
1000 INJECTION, SOLUTION INTRAVENOUS CONTINUOUS
Status: ACTIVE | OUTPATIENT
Start: 2017-08-30 | End: 2017-08-31

## 2017-08-30 RX ADMIN — IBUPROFEN 600 MG: 600 TABLET, FILM COATED ORAL at 11:08

## 2017-08-30 RX ADMIN — MAGNESIUM SULFATE IN WATER 2 G: 40 INJECTION, SOLUTION INTRAVENOUS at 04:08

## 2017-08-30 RX ADMIN — SODIUM CHLORIDE, SODIUM LACTATE, POTASSIUM CHLORIDE, AND CALCIUM CHLORIDE 1000 ML: .6; .31; .03; .02 INJECTION, SOLUTION INTRAVENOUS at 04:08

## 2017-08-30 RX ADMIN — MAGNESIUM SULFATE IN WATER 2 G/HR: 40 INJECTION, SOLUTION INTRAVENOUS at 05:08

## 2017-08-30 NOTE — SUBJECTIVE & OBJECTIVE
Obstetric History       T1      L1     SAB0   TAB0   Ectopic0   Multiple0   Live Births1       # Outcome Date GA Lbr Bhupinder/2nd Weight Sex Delivery Anes PTL Lv   1 Term 17 40w0d  4.024 kg (8 lb 13.9 oz) M CS-LTranv EPI N SOCO      Name: KEENA SANDOVAL      Complications: Failure to Progress in First Stage      Apgar1:  6                Apgar5: 9        Past Medical History:   Diagnosis Date    PCOS (polycystic ovarian syndrome)     Pre-eclampsia 2017     No past surgical history on file.    PTA Medications   Medication Sig    ibuprofen (ADVIL,MOTRIN) 600 MG tablet Take 1 tablet (600 mg total) by mouth every 6 (six) hours as needed for Pain.    ondansetron (ZOFRAN-ODT) 4 MG TbDL Take 2 tablets (8 mg total) by mouth every 8 (eight) hours.    oxycodone-acetaminophen (PERCOCET) 5-325 mg per tablet Take 1 tablet by mouth every 4 (four) hours as needed.    PNV59-iron,carb,fum-FA-DSS-dha (CITRANATAL HARMONY, IRON FUM,) 27 mg iron-1 mg -50 mg-260 mg Cap Take 1 capsule by mouth once daily.       Review of patient's allergies indicates:  No Known Allergies     Family History     None        Social History Main Topics    Smoking status: Never Smoker    Smokeless tobacco: Never Used    Alcohol use No    Drug use: No    Sexual activity: Yes     Review of Systems   Constitutional: Negative for chills and fever.   Eyes: Positive for visual disturbance (blurring vision).   Respiratory: Negative for shortness of breath.    Gastrointestinal: Positive for abdominal pain (at incision site; no RUQ pain). Negative for constipation, diarrhea, nausea and vomiting.   Genitourinary: Positive for vaginal bleeding (moderate).   Neurological: Negative for headaches.      Objective:     Vital Signs (Most Recent):  Temp: 97.5 °F (36.4 °C) (17 0843)  Pulse: 68 (17 1001)  Resp: 20 (17 1001)  BP: 118/78 (17 1001)  SpO2: 97 % (17 1001) Vital Signs (24h Range):  Temp:  [97.5 °F (36.4  °C)-98.5 °F (36.9 °C)] 97.5 °F (36.4 °C)  Pulse:  [68-96] 68  Resp:  [18-37] 20  SpO2:  [94 %-99 %] 97 %  BP: (118-147)/(71-90) 118/78        There is no height or weight on file to calculate BMI.    No LMP recorded (lmp unknown).    Physical Exam:   Constitutional: She is oriented to person, place, and time. She appears well-developed and well-nourished.    HENT:   Head: Normocephalic and atraumatic.    Eyes: EOM are normal.    Neck: Normal range of motion.    Cardiovascular: Normal rate, regular rhythm and normal heart sounds.     Pulmonary/Chest: Effort normal and breath sounds normal. No respiratory distress. She has no wheezes. She has no rales.        Abdominal: Soft. She exhibits abdominal incision (c/d/i, no drainage or bleeding). There is tenderness (near incision site. No RUQ tenderness). There is no rebound and no guarding.   Fundus firm below umbilicus             Musculoskeletal: Normal range of motion.       Neurological: She is alert and oriented to person, place, and time. She has normal reflexes.    Skin: Skin is warm and dry.    Psychiatric: She has a normal mood and affect. Her behavior is normal. Judgment and thought content normal.       Laboratory:  CMP:   Recent Labs  Lab 08/30/17  0136   GLU 94   CALCIUM 8.5*   ALBUMIN 2.5*   PROT 6.1      K 4.3   CO2 23      BUN 14   CREATININE 0.6   ALKPHOS 136*   ALT 73*   AST 81*   BILITOT 0.3       Recent Labs  Lab 08/30/17  0136   WBC 8.80   RBC 3.33*   HGB 9.7*   HCT 28.1*      MCV 84   MCH 29.1   MCHC 34.5     Magnesium 1.8     Specimen UA  Urine, Clean Catch   Color, UA Straw   Appearance, UA Clear   pH, UA 7.0   Specific Gravity, UA 1.010   Protein, UA  Negative   Glucose, UA  Negative   Ketones, UA  Negative   Bilirubin (UA)  Negative   Occult Blood UA 2+    Nitrite, UA  Negative   Urobilinogen, UA  Negative   Leukocytes, UA  Negative     RBC, UA 2   WBC, UA 1   Bacteria, UA Rare   Squam Epithel, UA 1

## 2017-08-30 NOTE — ED PROVIDER NOTES
Encounter Date: 2017    SCRIBE #1 NOTE: I, Davi Roth, am scribing for, and in the presence of,  Dr. Slater. I have scribed the following portions of the note - the APC attestation.       History     Chief Complaint   Patient presents with    Leg Swelling     Pt. delivered baby per c section 3 days ago, tonight pt. c feet swelling, leg swelling, face swelling, and lip tingling; pt. had bp of 134/97 at home     Patient is a 32-year-old  female that was discharge on 17 at 2 PM from [facility] presents the ED with bilateral lower extremity swelling, paresthesias, visual changes, and elevated blood pressure.  Patient had a  on 17.  She was discharged from [facility] on 17 at 2 PM.  Patient states that she has noticed increase edema in her bilateral lower extremities that has been worse today as well as new onset swelling to her face.  She reports onset of numbness to her toes, fingertips, and perioral region.  She endorses ringing in her ears and visual changes and states that it is hard to focus and occasionally things are very blurry. Her blood pressure at home was 134/97; patient states that her BP was usually 100-110 mmHg systolic BP while at [facility] and this is usually high for her. They called the nurse on-call and was instructed to report back to [facility].  However, while at [facility], patient states that her 4-day-old baby was not as alert and found to be hypoglycemic.  They did not treat the patient there and she did not mention her symptoms during the baby's evaluation.  Baby was transferred to [facility] to our [facility] ED to be evaluated, and now the patient would like to be evaluated. She complains of 6/10 abdominal pain at the site of her .          Review of patient's allergies indicates:  No Known Allergies  Past Medical History:   Diagnosis Date    PCOS (polycystic ovarian syndrome)     Pre-eclampsia 2017     No past surgical history on file.  Family History    Problem Relation Age of Onset    Breast cancer Neg Hx     Colon cancer Neg Hx     Ovarian cancer Neg Hx      Social History   Substance Use Topics    Smoking status: Never Smoker    Smokeless tobacco: Never Used    Alcohol use No     Review of Systems   Constitutional: Negative for chills and fever.   HENT: Negative for sore throat.         Tinnitus     Eyes: Positive for visual disturbance.   Respiratory: Negative for shortness of breath.    Cardiovascular: Positive for leg swelling. Negative for chest pain.   Gastrointestinal: Positive for abdominal pain. Negative for nausea.   Genitourinary: Negative for dysuria.   Musculoskeletal: Negative for back pain.   Skin: Negative for rash.   Neurological: Positive for numbness. Negative for weakness.   Hematological: Does not bruise/bleed easily.       Physical Exam     Initial Vitals [08/30/17 0030]   BP Pulse Resp Temp SpO2   (!) 147/90 95 18 98 °F (36.7 °C) 99 %      MAP       109         Physical Exam    Nursing note and vitals reviewed.  Constitutional: She appears well-developed and well-nourished. She is not diaphoretic. No distress.   HENT:   Head: Normocephalic and atraumatic.   Nose: Nose normal.   Eyes: Conjunctivae and EOM are normal.   Neck: Normal range of motion.   Cardiovascular: Normal rate, regular rhythm and normal heart sounds. Exam reveals no friction rub.    No murmur heard.  3+ edema bilaterally   Pulmonary/Chest: Breath sounds normal. No respiratory distress. She has no wheezes. She has no rales.   Abdominal: Soft. Bowel sounds are normal. She exhibits no distension. There is no tenderness. There is no rebound.   Musculoskeletal: Normal range of motion.   Neurological: She is alert and oriented to person, place, and time. She has normal strength. No sensory deficit.   Skin: Skin is warm and dry. No erythema. No pallor.   Psychiatric: She has a normal mood and affect. Her behavior is normal. Judgment and thought content normal.         ED  Course   Procedures  Labs Reviewed   CBC W/ AUTO DIFFERENTIAL - Abnormal; Notable for the following:        Result Value    RBC 3.33 (*)     Hemoglobin 9.7 (*)     Hematocrit 28.1 (*)     MPV 9.0 (*)     Gran% 74.8 (*)     Lymph% 16.0 (*)     All other components within normal limits   COMPREHENSIVE METABOLIC PANEL - Abnormal; Notable for the following:     Calcium 8.5 (*)     Albumin 2.5 (*)     Alkaline Phosphatase 136 (*)     AST 81 (*)     ALT 73 (*)     All other components within normal limits   URINALYSIS, REFLEX TO URINE CULTURE - Abnormal; Notable for the following:     Occult Blood UA 2+ (*)     All other components within normal limits    Narrative:     Preferred Collection Type->Urine, Clean Catch one urine cup    MAGNESIUM   URINALYSIS MICROSCOPIC    Narrative:     Preferred Collection Type->Urine, Clean Catch one urine cup           X-Rays:   Independently Interpreted Readings:   Chest X-Ray: Normal cardiac silhouette, clear lung fields      Medical Decision Making:   History:   Old Medical Records: I decided to obtain old medical records.  Clinical Tests:   Lab Tests: Reviewed and Ordered  Radiological Study: Ordered and Reviewed    Imaging Results          X-Ray Chest PA And Lateral (Final result)  Result time 08/30/17 01:47:02    Final result by Tejinder Pillai MD (08/30/17 01:47:02)                 Impression:      No acute radiographic findings of the chest.   ______________________________________     Electronically signed by resident: SANDI NEWELL MD  Date:     08/30/17  Time:    01:45            As the supervising and teaching physician, I personally reviewed the images and resident's interpretation and I agree with the findings.          Electronically signed by: TEJINDER PILLAI MD  Date:     08/30/17  Time:    01:47              Narrative:    Time of Procedure: 08/30/17 01:38:00  Accession # [id number]    Comparison: None.    Number of views: 2.    Findings:  Mediastinal structures are midline.   Cardiomediastinal silhouette is within normal limits.  Lung volumes are equal and symmetric, without evidence of acute cardiopulmonary process.   There is no pneumothorax. No pleural effusions.  No acute osseous abnormality.                                 APC / Resident Notes:       Patient recently discharge after .  She has been having lower extremity swelling, paresthesias, facial swelling, visual changes, and elevations in her blood pressure.  On exam, BP of 147/90.   Heart and lung exam unremarkable.  She reports blurred vision.Trace pitting edema bilaterally. DDX includes but is not limited to postpartum swelling, HTN, preeclampsia, heart failure, and less likely DVT.  Will order labs and imaging and reassess.    CBC with no leukocytosis or thrombocytopenia.  No anemia.  CMP with no electrolyte abnormalities. Cr WNL at 0.6. LFT mildly elevated with AST 81 and ALT 73.   Hypoalbuminemia at 2.5.  UA with no proteinuria.  Chest x-ray with no acute findings. No signs of heart failure.    Patient with concerning signs and symptoms for preeclampsia.  Will contact OB/GYN through transfer center.    Patient will be transferred to [facility] to postpartum unit due to concerning symptoms of preeclampsia.  Will load with magnesium and start magnesium infusion.  Patient made NPO.  Accepting physician is Dr. Tirado. Consult appreciated.            Scribe Attestation:   Scribe #1: I performed the above scribed service and the documentation accurately describes the services I performed. I attest to the accuracy of the note.    Attending Attestation:     Physician Attestation Statement for NP/PA:   I have conducted a face to face encounter with this patient in addition to the NP/PA, due to Medical Complexity    Other NP/PA Attestation Additions:    History of Present Illness: 32 y.o. woman who is 4 days status post  and who is  who is complaining of edema in her legs, arms, and face that developed today  as well as blurred vision, tingling in her hands and feet. Denies shortness of breath.    Physical Exam: Lungs are clear to auscultation. Heart- regular rate and rhythm, no murmur. Abdomen is mildly distended. Lower abdominal incision is clean dry and intact. She has pitting edema to above the knees bilaterally, no erythema or warmth of the lower extremities. There is edema of the face especially over the bridge of the nose. Her peripheral pulses are symmetric.    Medical Decision Making: After evaluating this pt we are concerned about the possibility of eclampsia vs postpartum cardiomyopathy and CHF. Labs and chest x-ray were ordered, plan to discuss with OB once labs have returned      Attending Critical Care:   Critical Care Times:   Direct Patient Care (initial evaluation, reassessments, and time considering the case)................................................................15 minutes.   Additional History from reviewing old medical records or taking additional history from the family, EMS, PCP, etc.......................5 minutes.   Ordering, Reviewing, and Interpreting Diagnostic Studies...............................................................................................................5 minutes.   Documentation..................................................................................................................................................................................5 minutes.   ==============================================================  · Total Critical Care Time - exclusive of procedural time: 30 minutes.  ==============================================================  Critical Care Condition: potentially life-threatening   Critical Care Comments: Critical time required in this pt with evidence of preeclampsia. She required a magnesium infusion to prevent end organ damage.      Physician Attestation for Aleks:  Physician Attestation Statement for Aleks #1: Dr. LA  Bryon, reviewed documentation, as scribed by Davi Roth in my presence, and it is both accurate and complete.         Attending ED Notes:   Labs have returned with no evidence of proteinuria. Her albumin is low at 2.5 which could explain her edema. Blood pressure has improved from 147/90 to 130/81. Will discuss with OB.     Labs are remarkable for no protein in her urine and a low albumin at 2.5 which could explain her edema. However given elevated blood pressure on arrival and visual symptoms we are concerned about the possibility of preeclampsia in this postpartum pt. After discussion with OB on call, we have decided to load her with magnesium and she will be transferred to [facility] labor and delivery where she will continue on a magnesium infusion.           ED Course      Clinical Impression:   The primary encounter diagnosis was Preeclampsia in postpartum period. A diagnosis of Varicose veins of leg with swelling, bilateral was also pertinent to this visit.    Disposition:   Disposition: Transferred                        Jocelyne Pereira PA-C  09/01/17 2039       Jocelyne Pereira PA-C  09/04/17 [id number]

## 2017-08-30 NOTE — MEDICAL/APP STUDENT
MAG NOTE     Tere Peña is a 32 y.o.  who is POD#4 s/p LTCS for arrest of fetal descent, on MgSO4 for postpartum pre-eclampsia.    Patient reports that swelling has decreased. Patient denies headaches, blurry vision, right upper quadrant pain, CP, SOB, nausea/vomiting.     Objective:       /86 (BP Location: Left arm, Patient Position: Lying)   Pulse 88   Temp 98.4 °F (36.9 °C) (Oral)   Resp 20   LMP  (LMP Unknown)   SpO2 97%   Vitals:    17 1349 17 1355 17 1400 17 1500   BP:  123/68  128/86   BP Location:  Left arm  Left arm   Patient Position:  Lying  Lying   Pulse: 83 85 87 88   Resp: 20 20  20   Temp: 98.4 °F (36.9 °C)      TempSrc: Oral      SpO2: 97% 96% 97% 97%         I/O last 3 completed shifts:  In: -   Out:  [Urine:2200]    I/O this shift:  In: 50 [I.V.:50]  Out:  [Urine:2225]      Date 17 07 - 17 0659   Shift 1645-2274 6759-4941 6443-3596 24 Hour Total   I  N  T  A  K  E   I.V. 50   50    Shift Total 50   50   O  U  T  P  U  T   Urine 2225   2225    Shift Total 2225   2225   Weight (kg)             General:   alert, appears stated age, cooperative and no distress   Lungs:   clear to auscultation bilaterally   Heart:   regular rate and rhythm, S1, S2 normal, no murmur, click, rub or gallop   Abdomen:  soft, non-tender; bowel sounds normal; no masses,  no organomegaly   Uterine Size:  firm located at the umblicus.    Extremities: peripheral pulses normal, minimal pedal edema Right greater than Left, no clubbing or cyanosis   Reflexes - 2+  bilaterally       Lab Review    Chemistries:     Recent Labs  Lab 17  0136      K 4.3      CO2 23   BUN 14   CREATININE 0.6   CALCIUM 8.5*   PROT 6.1   BILITOT 0.3   ALKPHOS 136*   ALT 73*   AST 81*   MG 1.8        CBC/Anemia Labs: Coags:      Recent Labs  Lab 17  0900 17  0543 17  0136   WBC 15.12* 14.80* 8.80   HGB 12.9 8.4* 9.7*   HCT 38.4 25.1* 28.1*    468 501    MCV 88 88 84   RDW 13.3 13.3 13.2    No results for input(s): INR, APTT in the last 168 hours.    Invalid input(s): PT     PC ratio unable to calculate - 17       Assessment:     Tere Peña is a 32 y.o.  who is POD#4 s/p LTCS, on MgSO4 for postpartum pre-eclampsia.     Plan:   1. Continue magnesium sulfate, no signs of toxicity at this time  2. Monitor for s/s of worsening Pre-Eclampsia  3. Close maternal monitoring including UOP and BP   - BP: ()/(57-90) 128/86   - UOP 150cc/hour  4. Recheck in 4 hours

## 2017-08-30 NOTE — PROGRESS NOTES
MAG NOTE     Tere Peña is a 32 y.o.  who is POD#4 s/p LTCS for arrest of fetal descent, on MgSO4 for postpartum pre-eclampsia.    Patient reports that swelling has decreased. Patient denies headaches, blurry vision, right upper quadrant pain, CP, SOB, nausea/vomiting.     Objective:       /86 (BP Location: Left arm, Patient Position: Lying)   Pulse 88   Temp 98.4 °F (36.9 °C) (Oral)   Resp 20   LMP  (LMP Unknown)   SpO2 97%   Vitals:    17 1349 17 1355 17 1400 17 1500   BP:  123/68  128/86   BP Location:  Left arm  Left arm   Patient Position:  Lying  Lying   Pulse: 83 85 87 88   Resp: 20 20  20   Temp: 98.4 °F (36.9 °C)      TempSrc: Oral      SpO2: 97% 96% 97% 97%         I/O last 3 completed shifts:  In: -   Out:  [Urine:2200]    I/O this shift:  In: 50 [I.V.:50]  Out:  [Urine:2225]      Date 17 07 - 17 0659   Shift 3401-1407 1972-9199 8960-7707 24 Hour Total   I  N  T  A  K  E   I.V. 50   50    Shift Total 50   50   O  U  T  P  U  T   Urine 2225   2225    Shift Total 2225   2225   Weight (kg)             General:   alert, appears stated age, cooperative and no distress   Lungs:   clear to auscultation bilaterally   Heart:   regular rate and rhythm, S1, S2 normal, no murmur, click, rub or gallop   Abdomen:  soft, non-tender; bowel sounds normal; no masses,  no organomegaly   Uterine Size:  firm located at the umblicus.    Extremities: peripheral pulses normal, minimal pedal edema Right greater than Left, no clubbing or cyanosis   Reflexes - 2+  bilaterally       Lab Review    Chemistries:     Recent Labs  Lab 17  0136      K 4.3      CO2 23   BUN 14   CREATININE 0.6   CALCIUM 8.5*   PROT 6.1   BILITOT 0.3   ALKPHOS 136*   ALT 73*   AST 81*   MG 1.8        CBC/Anemia Labs: Coags:      Recent Labs  Lab 17  0900 17  0543 17  0136   WBC 15.12* 14.80* 8.80   HGB 12.9 8.4* 9.7*   HCT 38.4 25.1* 28.1*    048 185    MCV 88 88 84   RDW 13.3 13.3 13.2    No results for input(s): INR, APTT in the last 168 hours.    Invalid input(s): PT     PC ratio unable to calculate - 17       Assessment:     Tere Peña is a 32 y.o.  who is POD#4 s/p LTCS, on MgSO4 for postpartum pre-eclampsia.     Plan:   1. Continue magnesium sulfate, no signs of toxicity at this time  2. Monitor for s/s of worsening Pre-Eclampsia  3. Close maternal monitoring including UOP and BP   - BP: ()/(57-90) 128/86   - UOP 150cc/hour  4. Recheck in 4 hours    Sergei Castaneda MD  PGY-2 OB/GYN  [phone]

## 2017-08-30 NOTE — PROGRESS NOTES
Pt is breast pumping now, in good spirits, on phone with friend, her mother and  at bedside with .

## 2017-08-30 NOTE — TELEPHONE ENCOUNTER
"  Reason for Disposition   SEVERE leg swelling (e.g., swelling extends above knee, entire leg is swollen)    Answer Assessment - Initial Assessment Questions  1. ONSET: "When did the swelling start?" (e.g., minutes, hours, days)      Since being d/c today  2. LOCATION: "What part of the leg is swollen?"  "Are both legs swollen or just one leg?"      From thighs to feet  3. SEVERITY: "How bad is the swelling?" (e.g., localized; mild, moderate, severe)   - Localized - small area of swelling localized to one leg   - MILD pedal edema - limited to foot and ankle, pitting edema < 1/4 inch deep (6 mm), rest and elevation eliminate most or all swelling   - MODERATE edema - swelling of lower leg to knee, pitting edema > 1/4 inch deep (6 mm), rest and elevation only partially reduce swelling   - SEVERE edema - swelling extends above knee, facial or hand swelling present       severe  4. REDNESS: "Does the swelling look red or infected?"      denies  5. PAIN: "Is the swelling painful to touch?" If so, ask: "How painful is it?"   (Scale 1-10; mild, moderate or severe)      Tingling in feet, & lips/ pain in feet as well    8. OTHER SYMPTOMS: "Do you have any other symptoms?" (e.g., chest pain, difficulty breathing)      Denies CP or SOB  9. DELIVERY DATE: "When was your delivery date?" "Vaginal delivery or ?"       17    Protocols used: ST POSTPARTUM - LEG SWELLING AND EDEMA-A-AH   of pt states she had a  17/ d/c today/ states wife's legs are swollen from thighs down to feet/ cannot fit in her shoes/ pain in feet/ c/o tingling in feet and lips/  states her hands and face look swollen --new since being home/ /93 HR 97/ states her BP is elevated since d/c    To ER for evaluation and tx    Allie Posey RN  "

## 2017-08-30 NOTE — MEDICAL/APP STUDENT
[facility] Medicine  History & Physical    Patient Name: Tere Peña  MRN: [id number]  Admission Date: 2017  Attending Physician: {ATTENDING PROVIDER:[id number]}  Primary Care Provider: Primary Doctor No             Subjective:       Chief Complaint:   Chief Complaint   Patient presents with    Leg Swelling     Pt. delivered baby per c section 3 days ago, tonight pt. c feet swelling, leg swelling, face swelling, and lip tingling; pt. had bp of 134/97 at home        HPI: Tere Peña is a 32 year old G1PI [facility] who [facility] ED last night with lower extremity and facial swelling of acute onset.  She also had numbness and tingling in her hands and in the perioral area. Patient had an uncomplicated pregnancy and delivery was complicated by arrest of dilation.  Patient had a  on 17 and was discharged from [facility] on 17 after an uneventful recovery. Before presenting to the ED her blood pressure was in the 130s systolic at home and patient stated her normal range is 100-110 systolic.  She reports moderate abdominal pain localized to her incision site.  Patient also reports increased water consumption around 4-6L per day since delivery.    Past Medical History:   Diagnosis Date    PCOS (polycystic ovarian syndrome)     Pre-eclampsia 2017       No past surgical history on file.    Review of patient's allergies indicates:  No Known Allergies    Current Facility-Administered Medications on File Prior to Encounter   Medication    [DISCONTINUED] diphenhydrAMINE capsule 25 mg    [DISCONTINUED] docusate sodium capsule 200 mg    [DISCONTINUED] hydrocortisone 2.5 % rectal cream    [DISCONTINUED] ibuprofen tablet 600 mg    [DISCONTINUED] iron polysaccharides capsule 150 mg    [DISCONTINUED] lanolin cream    [DISCONTINUED] measles, mumps and rubella vaccine 1,000-12,500 TCID50/0.5 mL injection 0.5 mL    [DISCONTINUED] naloxone 0.4 mg/mL  "injection 0.04 mg    [DISCONTINUED] ondansetron disintegrating tablet 8 mg    [DISCONTINUED] ondansetron injection 4 mg    [DISCONTINUED] oxycodone immediate release tablet 10 mg    [DISCONTINUED] oxycodone immediate release tablet 5 mg    [DISCONTINUED] oxycodone-acetaminophen  mg per tablet 1 tablet    [DISCONTINUED] oxycodone-acetaminophen 5-325 mg per tablet 1 tablet    [DISCONTINUED] promethazine (PHENERGAN) 12.5 mg in dextrose 5 % 50 mL IVPB    [DISCONTINUED] promethazine (PHENERGAN) 6.25 mg in dextrose 5 % 50 mL IVPB    [DISCONTINUED] senna-docusate 8.6-50 mg per tablet 1 tablet    [DISCONTINUED] simethicone chewable tablet 80 mg    [DISCONTINUED] Tdap vaccine injection 0.5 mL     Current Outpatient Prescriptions on File Prior to Encounter   Medication Sig    ibuprofen (ADVIL,MOTRIN) 600 MG tablet Take 1 tablet (600 mg total) by mouth every 6 (six) hours as needed for Pain.    ondansetron (ZOFRAN-ODT) 4 MG TbDL Take 2 tablets (8 mg total) by mouth every 8 (eight) hours.    oxycodone-acetaminophen (PERCOCET) 5-325 mg per tablet Take 1 tablet by mouth every 4 (four) hours as needed.    PNV59-iron,carb,fum-FA-DSS-dha (CITRANATAL HARMONY, IRON FUM,) 27 mg iron-1 mg -50 mg-260 mg Cap Take 1 capsule by mouth once daily.     Family History     None        Social History Main Topics    Smoking status: Never Smoker    Smokeless tobacco: Never Used    Alcohol use No    Drug use: No    Sexual activity: Yes     Review of Systems   HENT: Positive for facial swelling and tinnitus.    Eyes: Positive for visual disturbance ( reports blurry double vision and flashing visual phenomenon). Negative for pain.   Respiratory: Negative for chest tightness and shortness of breath.         Reports "heavy chest" but denies dyspnea and chest pain   Cardiovascular: Positive for leg swelling ( minimal, right greater than left). Negative for chest pain.   Gastrointestinal: Positive for abdominal pain (appropriate, " around incisional site). Negative for nausea and vomiting.   Genitourinary: Positive for vaginal bleeding ( minimal ). Negative for difficulty urinating.   Skin: Positive for wound ( no discharge, healing well).   Neurological: Positive for numbness ( fingertips). Negative for dizziness and headaches.     Objective:     Vital Signs (Most Recent):  Temp: 97.5 °F (36.4 °C) (08/30/17 0843)  Pulse: 94 (08/30/17 0843)  Resp: 20 (08/30/17 0843)  BP: 135/82 (08/30/17 0843)  SpO2: 96 % (08/30/17 0843) Vital Signs (24h Range):  Temp:  [97.5 °F (36.4 °C)-98.5 °F (36.9 °C)] 97.5 °F (36.4 °C)  Pulse:  [84-96] 94  Resp:  [18-37] 20  SpO2:  [94 %-99 %] 96 %  BP: (121-147)/(71-90) 135/82       Physical Exam   Constitutional: She is oriented to person, place, and time. She appears well-developed and well-nourished. No distress.   HENT:   Head: Normocephalic and atraumatic.   Eyes: EOM are normal. Pupils are equal, round, and reactive to light.   Possible horizontal nystagmus, more prominent on L eye   Cardiovascular: Normal rate and regular rhythm.    Pulmonary/Chest: Effort normal and breath sounds normal. No respiratory distress. She has no wheezes.   Abdominal: Soft. She exhibits no distension. There is tenderness (around incisional site ). There is no guarding.   Uterine fundus firm and palpable below umbilicus   Musculoskeletal: She exhibits edema ( lower extremity, right greater than left).   Neurological: She is alert and oriented to person, place, and time. She displays normal reflexes.       Significant Labs: {Results:12867}    Significant Imaging: {Imaging Review:29218}    Assessment/Plan:     Active Diagnoses:    Diagnosis Date Noted POA    Preeclampsia in postpartum period [O14.95] 08/30/2017 Yes      Problems Resolved During this Admission:    Diagnosis Date Noted Date Resolved POA     VTE Risk Mitigation         Ordered     Low Risk of VTE  Once      08/30/17 0705            Kelli Smith  Department of  [facility] Medicine   [facility]

## 2017-08-30 NOTE — H&P
[facility]  Obstetrics & Gynecology  History & Physical    Patient Name: Tere Sandoval  MRN: [id number]  Admission Date: 2017  Primary Care Provider: Primary Doctor No    Subjective:     Chief Complaint/Reason for Admission: elevated BP, visual symtoms, on Mag    History of Present Illness:  Tere Sandoval is a 32 y.o. [id number] who is POD#4 s/p LTCS for arrest of fetal descent complicated by PPH with EBL of 1930. She presented to [facility] ED with worsened swelling in her bilateral lower extremities and noticed facial swelling as well. A blood pressure reading at home was 134/97. She was found to have elevated blood pressures (147/90) in the ED. She was She noted visual changes (blurring) but no scotomata, and continues to have visual blurring. She notes pressure in the back of her head but denies headaches. She denies chest pain, SOB, RUQ pain, still notes vision blurring, and has continued lower extremity swelling. She is still voiding without difficulty and has had one BM. She is passing gas and eating without n/v. She complains of moderate abdominal pain, controlled on her home medications.  She was started on magnesium in the [facility] ED due to concern for pre-eclampsia given elevated blood pressures and visual symptoms and was transferred for continued management.   Of note, her baby was seen as well for decreased activity and low blood sugars. He is in the room with her.        Obstetric History       T1      L1     SAB0   TAB0   Ectopic0   Multiple0   Live Births1       # Outcome Date GA Lbr Bhupinder/2nd Weight Sex Delivery Anes PTL Lv   1 Term 17 40w0d  4.024 kg (8 lb 13.9 oz) M CS-LTranv EPI N SOCO      Name: KEENA SANDOVAL      Complications: Failure to Progress in First Stage      Apgar1:  6                Apgar5: 9        Past Medical History:   Diagnosis Date    PCOS (polycystic ovarian syndrome)     Pre-eclampsia 2017     No past surgical history on file.    PTA Medications    Medication Sig    ibuprofen (ADVIL,MOTRIN) 600 MG tablet Take 1 tablet (600 mg total) by mouth every 6 (six) hours as needed for Pain.    ondansetron (ZOFRAN-ODT) 4 MG TbDL Take 2 tablets (8 mg total) by mouth every 8 (eight) hours.    oxycodone-acetaminophen (PERCOCET) 5-325 mg per tablet Take 1 tablet by mouth every 4 (four) hours as needed.    PNV59-iron,carb,fum-FA-DSS-dha (CITRANATAL HARMONY, IRON FUM,) 27 mg iron-1 mg -50 mg-260 mg Cap Take 1 capsule by mouth once daily.       Review of patient's allergies indicates:  No Known Allergies     Family History     None        Social History Main Topics    Smoking status: Never Smoker    Smokeless tobacco: Never Used    Alcohol use No    Drug use: No    Sexual activity: Yes     Review of Systems   Constitutional: Negative for chills and fever.   Eyes: Positive for visual disturbance (blurring vision).   Respiratory: Negative for shortness of breath.    Gastrointestinal: Positive for abdominal pain (at incision site; no RUQ pain). Negative for constipation, diarrhea, nausea and vomiting.   Genitourinary: Positive for vaginal bleeding (moderate).   Neurological: Negative for headaches.      Objective:     Vital Signs (Most Recent):  Temp: 97.5 °F (36.4 °C) (08/30/17 0843)  Pulse: 68 (08/30/17 1001)  Resp: 20 (08/30/17 1001)  BP: 118/78 (08/30/17 1001)  SpO2: 97 % (08/30/17 1001) Vital Signs (24h Range):  Temp:  [97.5 °F (36.4 °C)-98.5 °F (36.9 °C)] 97.5 °F (36.4 °C)  Pulse:  [68-96] 68  Resp:  [18-37] 20  SpO2:  [94 %-99 %] 97 %  BP: (118-147)/(71-90) 118/78        There is no height or weight on file to calculate BMI.    No LMP recorded (lmp unknown).    Physical Exam:   Constitutional: She is oriented to person, place, and time. She appears well-developed and well-nourished.    HENT:   Head: Normocephalic and atraumatic.    Eyes: EOM are normal.    Neck: Normal range of motion.    Cardiovascular: Normal rate, regular rhythm and normal heart sounds.      Pulmonary/Chest: Effort normal and breath sounds normal. No respiratory distress. She has no wheezes. She has no rales.        Abdominal: Soft. She exhibits abdominal incision (c/d/i, no drainage or bleeding). There is tenderness (near incision site. No RUQ tenderness). There is no rebound and no guarding.   Fundus firm below umbilicus             Musculoskeletal: Normal range of motion.       Neurological: She is alert and oriented to person, place, and time. She has normal reflexes.    Skin: Skin is warm and dry.    Psychiatric: She has a normal mood and affect. Her behavior is normal. Judgment and thought content normal.       Laboratory:  CMP:   Recent Labs  Lab 08/30/17  0136   GLU 94   CALCIUM 8.5*   ALBUMIN 2.5*   PROT 6.1      K 4.3   CO2 23      BUN 14   CREATININE 0.6   ALKPHOS 136*   ALT 73*   AST 81*   BILITOT 0.3       Recent Labs  Lab 08/30/17  0136   WBC 8.80   RBC 3.33*   HGB 9.7*   HCT 28.1*      MCV 84   MCH 29.1   MCHC 34.5     Magnesium 1.8     Specimen UA  Urine, Clean Catch   Color, UA Straw   Appearance, UA Clear   pH, UA 7.0   Specific Gravity, UA 1.010   Protein, UA  Negative   Glucose, UA  Negative   Ketones, UA  Negative   Bilirubin (UA)  Negative   Occult Blood UA 2+    Nitrite, UA  Negative   Urobilinogen, UA  Negative   Leukocytes, UA  Negative     RBC, UA 2   WBC, UA 1   Bacteria, UA Rare   Squam Epithel, UA 1           Assessment/Plan:     Preeclampsia in postpartum period    - BP: (118-147)/(71-90) 118/78  - Vision blurring, no scotomata. Pre-E ROS negative.  - Will continue to monitor vitals q6h  - CMP at [facility] [place] [place] showed transaminitis (AST/ALT 81/73)  - Pre-E labs drawn - pending  - Magnesium sulfate started. Will assess for toxicity q4h.              Sushma K Reddy, MD  Obstetrics & Gynecology  [facility]

## 2017-08-30 NOTE — ED TRIAGE NOTES
Pt. delivered baby per c section 3 days ago, tonight around 2100 she noticed pt. c feet swelling and numbness, leg swelling, face swelling, hand numbness, and lip tingling; pt. had bp of 134/97 at home.  Prior to this pt. Has no medical problems; healthy pregnancy, delivered per c section for failure to progress, no postdelivery problems until now. No other s/s or complaints.   PTA, pt. Took 1 percodet around 1600.      APPEARANCE: Resting comfortably in no acute distress. Patient has clean hair, skin and nails. Clothing is appropriate and properly fastened.   NEURO: Awake, alert, appropriate for age, and cooperative with a calm affect; pupils equal and round, pupils reactive.   HEENT: Head symmetrical. Eyes bilateral  without redness or drainage. Bilateral ears without drainage. Bilateral nares patent without drainage.   CARDIAC: Regular rate and rhythm; no murmur noted.   RESPIRATORY: Airway is open and patent. Lungs are clear to auscultation bilaterally. Respirations are spontaneous on room air. Normal respiratory effort and rate noted.   GI/: Abdomen soft and non-distended. Adequate bowel sounds auscultated with no tenderness noted on palpation in all four quadrants. Patient is reported to void and stool appropriately for age.   NEUROVASCULAR: All extremities are warm and pink with +2 pulses and capillary refill less than 3 seconds.   MUSCULOSKELETAL: swelling to feet  SKIN: Warm and dry, adequate turgor, mucus membranes moist and pink; no breakdown, lesions, or ecchymosis noted.   SOCIAL: Patient is accompanied by child and .   Will continue to monitor.

## 2017-08-30 NOTE — HPI
Tere Peña is a 32 y.o. [id number] who is POD#4 s/p LTCS for arrest of fetal descent complicated by PPH with EBL of 1930. She presented to [facility] ED with worsened swelling in her bilateral lower extremities and noticed facial swelling as well. A blood pressure reading at home was 134/97. She was found to have elevated blood pressures (147/90) in the ED. She was She noted visual changes (blurring) but no scotomata, and continues to have visual blurring. She notes pressure in the back of her head but denies headaches. She denies chest pain, SOB, RUQ pain, still notes vision blurring, and has continued lower extremity swelling. She is still voiding without difficulty and has had one BM. She is passing gas and eating without n/v. She complains of moderate abdominal pain, controlled on her home medications.  She was started on magnesium in the [facility] ED due to concern for pre-eclampsia given elevated blood pressures and visual symptoms and was transferred for continued management.   Of note, her baby was seen as well for decreased activity and low blood sugars. He is in the room with her.

## 2017-08-30 NOTE — HOSPITAL COURSE
08/30/2017 - Patient admitted following worsened lower extremity swelling and facial swelling with new onset elevated BP.  P/C unable to calculate low, although LFTs elevated (81/73). Started on MgSO4 for preE with SF (LFTs).   08/31/2017 - MgSO4 discontinued after 24h. PreE ROS negative. BPs mostly normal with occasional mild range.

## 2017-08-31 VITALS
OXYGEN SATURATION: 98 % | DIASTOLIC BLOOD PRESSURE: 76 MMHG | RESPIRATION RATE: 18 BRPM | TEMPERATURE: 98 F | HEART RATE: 97 BPM | SYSTOLIC BLOOD PRESSURE: 129 MMHG

## 2017-08-31 LAB
ALBUMIN SERPL BCP-MCNC: 2.4 G/DL
ALP SERPL-CCNC: 111 U/L
ALT SERPL W/O P-5'-P-CCNC: 97 U/L
ANION GAP SERPL CALC-SCNC: 7 MMOL/L
AST SERPL-CCNC: 91 U/L
BASOPHILS # BLD AUTO: 0.01 K/UL
BASOPHILS NFR BLD: 0.1 %
BILIRUB SERPL-MCNC: 0.3 MG/DL
BUN SERPL-MCNC: 7 MG/DL
CALCIUM SERPL-MCNC: 7.2 MG/DL
CHLORIDE SERPL-SCNC: 106 MMOL/L
CO2 SERPL-SCNC: 25 MMOL/L
CREAT SERPL-MCNC: 0.6 MG/DL
DIFFERENTIAL METHOD: ABNORMAL
EOSINOPHIL # BLD AUTO: 0.2 K/UL
EOSINOPHIL NFR BLD: 2.2 %
ERYTHROCYTE [DISTWIDTH] IN BLOOD BY AUTOMATED COUNT: 13.2 %
EST. GFR  (AFRICAN AMERICAN): >60 ML/MIN/1.73 M^2
EST. GFR  (NON AFRICAN AMERICAN): >60 ML/MIN/1.73 M^2
GLUCOSE SERPL-MCNC: 98 MG/DL
HCT VFR BLD AUTO: 28.5 %
HGB BLD-MCNC: 9.5 G/DL
LYMPHOCYTES # BLD AUTO: 1.6 K/UL
LYMPHOCYTES NFR BLD: 18.1 %
MCH RBC QN AUTO: 29.1 PG
MCHC RBC AUTO-ENTMCNC: 33.3 G/DL
MCV RBC AUTO: 87 FL
MONOCYTES # BLD AUTO: 0.6 K/UL
MONOCYTES NFR BLD: 7.3 %
NEUTROPHILS # BLD AUTO: 6.2 K/UL
NEUTROPHILS NFR BLD: 70.7 %
PLATELET # BLD AUTO: 284 K/UL
PMV BLD AUTO: 9.2 FL
POTASSIUM SERPL-SCNC: 3.8 MMOL/L
PROT SERPL-MCNC: 5.6 G/DL
RBC # BLD AUTO: 3.26 M/UL
SODIUM SERPL-SCNC: 138 MMOL/L
WBC # BLD AUTO: 8.71 K/UL

## 2017-08-31 PROCEDURE — 85025 COMPLETE CBC W/AUTO DIFF WBC: CPT

## 2017-08-31 PROCEDURE — 63600175 PHARM REV CODE 636 W HCPCS: Performed by: OBSTETRICS & GYNECOLOGY

## 2017-08-31 PROCEDURE — 36415 COLL VENOUS BLD VENIPUNCTURE: CPT

## 2017-08-31 PROCEDURE — 80053 COMPREHEN METABOLIC PANEL: CPT

## 2017-08-31 PROCEDURE — 25000003 PHARM REV CODE 250: Performed by: OBSTETRICS & GYNECOLOGY

## 2017-08-31 PROCEDURE — 25000003 PHARM REV CODE 250: Performed by: STUDENT IN AN ORGANIZED HEALTH CARE EDUCATION/TRAINING PROGRAM

## 2017-08-31 PROCEDURE — 99239 HOSP IP/OBS DSCHRG MGMT >30: CPT | Mod: ,,, | Performed by: OBSTETRICS & GYNECOLOGY

## 2017-08-31 RX ORDER — SODIUM CHLORIDE, SODIUM LACTATE, POTASSIUM CHLORIDE, CALCIUM CHLORIDE 600; 310; 30; 20 MG/100ML; MG/100ML; MG/100ML; MG/100ML
INJECTION, SOLUTION INTRAVENOUS CONTINUOUS
Status: DISCONTINUED | OUTPATIENT
Start: 2017-08-31 | End: 2017-08-31

## 2017-08-31 RX ADMIN — HYDROCODONE BITARTRATE AND ACETAMINOPHEN 1 TABLET: 7.5; 325 TABLET ORAL at 12:08

## 2017-08-31 RX ADMIN — SODIUM CHLORIDE, SODIUM LACTATE, POTASSIUM CHLORIDE, AND CALCIUM CHLORIDE: .6; .31; .03; .02 INJECTION, SOLUTION INTRAVENOUS at 02:08

## 2017-08-31 RX ADMIN — IBUPROFEN 600 MG: 600 TABLET, FILM COATED ORAL at 12:08

## 2017-08-31 RX ADMIN — HYDROCODONE BITARTRATE AND ACETAMINOPHEN 1 TABLET: 7.5; 325 TABLET ORAL at 04:08

## 2017-08-31 RX ADMIN — IBUPROFEN 600 MG: 600 TABLET, FILM COATED ORAL at 07:08

## 2017-08-31 RX ADMIN — MAGNESIUM SULFATE IN WATER 2 G/HR: 40 INJECTION, SOLUTION INTRAVENOUS at 02:08

## 2017-08-31 NOTE — PROGRESS NOTES
Following reinforcement of d/c instructions, pt d/cd home via w/c per MD orders with infant in arms, accompanied by family

## 2017-08-31 NOTE — MEDICAL/APP STUDENT
Magnesium Assessment Note    Subjective:         Tere Peña is a 32 y.o.  who is POD#4 s/p LTCS for arrest of fetal descent, on MgSO4 for postpartum seizure prophylaxis.    Patient denies headaches, denies blurry vision and denies right upper quadrant pain. Denies CP, denies SOB. Patient reports no nausea/no vomiting.     Objective:      Temp:  [97.5 °F (36.4 °C)-98.5 °F (36.9 °C)] 98.3 °F (36.8 °C)  Pulse:  [68-96] 81  Resp:  [18-37] 18  SpO2:  [94 %-99 %] 96 %  BP: ()/(57-90) 119/81    I/O last 3 completed shifts:  In: 1200 [I.V.:1200]  Out: 6025 [Urine:6025]  I/O this shift:  In: -   Out: 1350 [Urine:1350]    General:   alert, appears stated age and cooperative   Lungs:   clear to auscultation bilaterally   Heart::   regular rate and rhythm, S1, S2 normal, no murmur, click, rub or gallop   Extremities: peripheral pulses normal, no pedal edema, no clubbing or cyanosis   DTRs- 2+  bilaterally       Lab Review  Recent Results (from the past 24 hour(s))   CBC auto differential    Collection Time: 17  1:36 AM   Result Value Ref Range    WBC 8.80 3.90 - 12.70 K/uL    RBC 3.33 (L) 4.00 - 5.40 M/uL    Hemoglobin 9.7 (L) 12.0 - 16.0 g/dL    Hematocrit 28.1 (L) 37.0 - 48.5 %    MCV 84 82 - 98 fL    MCH 29.1 27.0 - 31.0 pg    MCHC 34.5 32.0 - 36.0 g/dL    RDW 13.2 11.5 - 14.5 %    Platelets 275 150 - 350 K/uL    MPV 9.0 (L) 9.2 - 12.9 fL    Gran # 6.6 1.8 - 7.7 K/uL    Lymph # 1.4 1.0 - 4.8 K/uL    Mono # 0.5 0.3 - 1.0 K/uL    Eos # 0.2 0.0 - 0.5 K/uL    Baso # 0.01 0.00 - 0.20 K/uL    Gran% 74.8 (H) 38.0 - 73.0 %    Lymph% 16.0 (L) 18.0 - 48.0 %    Mono% 5.6 4.0 - 15.0 %    Eosinophil% 2.5 0.0 - 8.0 %    Basophil% 0.1 0.0 - 1.9 %    Differential Method Automated    Comprehensive metabolic panel    Collection Time: 17  1:36 AM   Result Value Ref Range    Sodium 140 136 - 145 mmol/L    Potassium 4.3 3.5 - 5.1 mmol/L    Chloride 109 95 - 110 mmol/L    CO2 23 23 - 29 mmol/L    Glucose 94 70 - 110 mg/dL     BUN, Bld 14 6 - 20 mg/dL    Creatinine 0.6 0.5 - 1.4 mg/dL    Calcium 8.5 (L) 8.7 - 10.5 mg/dL    Total Protein 6.1 6.0 - 8.4 g/dL    Albumin 2.5 (L) 3.5 - 5.2 g/dL    Total Bilirubin 0.3 0.1 - 1.0 mg/dL    Alkaline Phosphatase 136 (H) 55 - 135 U/L    AST 81 (H) 10 - 40 U/L    ALT 73 (H) 10 - 44 U/L    Anion Gap 8 8 - 16 mmol/L    eGFR if African American >60.0 >60 mL/min/1.73 m^2    eGFR if non African American >60.0 >60 mL/min/1.73 m^2   Magnesium    Collection Time: 17  1:36 AM   Result Value Ref Range    Magnesium 1.8 1.6 - 2.6 mg/dL   Urinalysis, Reflex to Urine Culture Urine, Clean Catch    Collection Time: 17  1:36 AM   Result Value Ref Range    Specimen UA Urine, Clean Catch     Color, UA Straw Yellow, Straw, Kat    Appearance, UA Clear Clear    pH, UA 7.0 5.0 - 8.0    Specific Gravity, UA 1.010 1.005 - 1.030    Protein, UA Negative Negative    Glucose, UA Negative Negative    Ketones, UA Negative Negative    Bilirubin (UA) Negative Negative    Occult Blood UA 2+ (A) Negative    Nitrite, UA Negative Negative    Urobilinogen, UA Negative <2.0 EU/dL    Leukocytes, UA Negative Negative   Urinalysis Microscopic    Collection Time: 17  1:36 AM   Result Value Ref Range    RBC, UA 2 0 - 4 /hpf    WBC, UA 1 0 - 5 /hpf    Bacteria, UA Rare None-Occ /hpf    Squam Epithel, UA 1 /hpf    Microscopic Comment SEE COMMENT    Protein / creatinine ratio, urine    Collection Time: 17 11:15 AM   Result Value Ref Range    Protein, Urine Random <7 0 - 15 mg/dL    Creatinine, Random Ur 13.1 (L) 15.0 - 325.0 mg/dL    Prot/Creat Ratio, Ur Unable to calculate 0.00 - 0.20        Assessment:   Tere Peña is a 32 y.o.  who is POD#4 s/p LTCS, on MgSO4 for postpartum pre-eclampsia.        Plan:     - Continue magnesium sulfate, no signs of toxicity at this time  - Close maternal monitoring including UOP and BP                        - BPs: (119-137)/(73-82) over last 4hrs                        - UOP:  450 cc/hr over last 4hrs  - Recheck in 4 hrs (03:30)      Luis Daniel Tinsley

## 2017-08-31 NOTE — PROGRESS NOTES
[facility]  Obstetrics & Gynecology  Progress Note    Patient Name: Tere Peña  MRN: [id number]  Admission Date: 2017  Primary Care Provider: Primary Doctor No  Principal Problem: PreE, postpartum    Subjective:     HPI:  Tere Peña is a 32 y.o. [id number] who is POD#4 s/p LTCS for arrest of fetal descent complicated by PPH with EBL of 1930. She presented to [facility] ED with worsened swelling in her bilateral lower extremities and noticed facial swelling as well. A blood pressure reading at home was 134/97. She was found to have elevated blood pressures (147/90) in the ED. She was She noted visual changes (blurring) but no scotomata, and continues to have visual blurring. She notes pressure in the back of her head but denies headaches. She denies chest pain, SOB, RUQ pain, still notes vision blurring, and has continued lower extremity swelling. She is still voiding without difficulty and has had one BM. She is passing gas and eating without n/v. She complains of moderate abdominal pain, controlled on her home medications.  She was started on magnesium in the [facility] ED due to concern for pre-eclampsia given elevated blood pressures and visual symptoms and was transferred for continued management.   Of note, her baby was seen as well for decreased activity and low blood sugars. He is in the room with her.    Interval History:     Doing well this morning. Pt denies HA/vision changes/CP/SOB. Denies lightheadedness/dizziness. Denies RUQ or epigastric pain. She feels her LE and facial swelling are decreasing.     Scheduled Meds:   ibuprofen  600 mg Oral Q6H     Continuous Infusions:   benzocaine-lanolin-aloe vera      lactated Ringers 50 mL/hr at 17 0234    magnesium sulfate in water 2 g/hr (17 0229)     PRN Meds:acetaminophen, benzocaine-lanolin-aloe vera, calcium gluconate, diphenhydrAMINE, docusate sodium, hydrocodone-acetaminophen 5-325mg, hydrocodone-acetaminophen 7.5-325mg, hydrocortisone,  lanolin, ondansetron, promethazine (PHENERGAN) IVPB    Review of patient's allergies indicates:  No Known Allergies    Objective:     Vital Signs (Most Recent):  Temp: 98.2 °F (36.8 °C) (08/31/17 0000)  Pulse: 83 (08/31/17 0305)  Resp: 16 (08/31/17 0000)  BP: 116/73 (08/31/17 0305)  SpO2: 98 % (08/31/17 0305) Vital Signs (24h Range):  Temp:  [97.5 °F (36.4 °C)-98.5 °F (36.9 °C)] 98.2 °F (36.8 °C)  Pulse:  [68-96] 83  Resp:  [16-37] 16  SpO2:  [94 %-99 %] 98 %  BP: ()/(57-89) 116/73        There is no height or weight on file to calculate BMI.  No LMP recorded (lmp unknown).    I&O (Last 24H):  I/O last 3 completed shifts:  In: 1200 [I.V.:1200]  Out: 6025 [Urine:6025]    Laboratory:    Recent Labs  Lab 08/25/17  0900 08/27/17  0543 08/30/17  0136 08/30/17  1115   WBC 15.12* 14.80* 8.80  --    HGB 12.9 8.4* 9.7*  --    HCT 38.4 25.1* 28.1*  --     188 275  --    BUN  --   --  14  --    CREATININE  --   --  0.6  --    ALT  --   --  73*  --    AST  --   --  81*  --    MG  --   --  1.8  --    UTPCR  --   --   --  Unable to calculate          Diagnostic Results:  N/A    Physical Exam:   Constitutional: She is oriented to person, place, and time. She appears well-developed and well-nourished. No distress.    HENT:   Head: Normocephalic and atraumatic.      Cardiovascular: Normal rate and regular rhythm.     Pulmonary/Chest: Effort normal.        Abdominal: Soft. Bowel sounds are normal. She exhibits abdominal incision (c/d/i). She exhibits no distension. There is no tenderness (appropriate, generalized). There is no rebound and no guarding.     Genitourinary:   Genitourinary Comments: Fundus firm below umbilicus           Musculoskeletal: She exhibits no edema.       Neurological: She is alert and oriented to person, place, and time.    Skin: Skin is warm and dry.    Psychiatric: She has a normal mood and affect.       Assessment/Plan:     Preeclampsia in postpartum period    - BP: ()/(57-89) 116/73  (mild range x 1)  - no antihypertensives required (IV or PO)  - PreE ROS neg this am  - Transaminitis (AST/ALT 81/73) - repeat pending this am  - discontinue MgSO4 at this time (s/p 24h and asymptomatic)  - I/O 24h (net): 2800/6800cc (-4000cc)          Plan for DC today.    Deja Gallegos MD  Obstetrics & Gynecology  [facility]

## 2017-08-31 NOTE — PLAN OF CARE
Problem: Hypertensive Disorders in Pregnancy (Adult,Obstetrics,Pediatric)  Goal: Signs and Symptoms of Listed Potential Problems Will be Absent, Minimized or Managed (Hypertensive Disorders in Pregnancy)  Signs and symptoms of listed potential problems will be absent, minimized or managed by discharge/transition of care (reference Hypertensive Disorders in Pregnancy (Adult,Obstetrics,Pediatric) CPG).   Outcome: Outcome(s) achieved Date Met: 08/31/17  Pt tolerating regular diet, voiding, ambulating room, passing gas. Blood pressure well controlled without meds. Denies HA, RUQ pain, or blurry vision. Pain well controlled on prescribed oral meds. Prepared for d/c home per Md orders

## 2017-08-31 NOTE — LACTATION NOTE
Pt will continue to breastfeed, supplement, and pump. Encouraged to pump with each supplementation. Pt has now  Questions about breastfeeding. Pt s lactation contact number.

## 2017-08-31 NOTE — PLAN OF CARE
Problem: Patient Care Overview  Goal: Plan of Care Review  Outcome: Ongoing (interventions implemented as appropriate)  Lactation note:  To room to assist with use/care of a symphony breast pump. The patient was currently single pumping on left breast. Encouraged double pumping at highest comfortable suction for 8 or more times in 24 hours. Increased flange size to 27 mm after noting tightness. Patient reports pumping comfortable and 20 ml were obtained from both breasts. We discussed ways to increase milk supply as well as storage of breast milk and cleaning of supplies. Patient to call as needed for assistance.

## 2017-08-31 NOTE — ASSESSMENT & PLAN NOTE
- BP: ()/(57-89) 116/73 (mild range x 1)  - no antihypertensives required (IV or PO)  - PreE ROS neg this am  - Transaminitis (AST/ALT 81/73) - repeat pending this am  - discontinue MgSO4 at this time (s/p 24h and asymptomatic)

## 2017-08-31 NOTE — DISCHARGE SUMMARY
[facility]  Obstetrics & Gynecology  Discharge Summary    Patient Name: Tere Peña  MRN: [id number]  Admission Date: 2017  Hospital Length of Stay: 1 days  Discharge Date and Time:  2017 10:45 AM  Attending Physician: Maya Falcon MD   Discharging Provider: Torin Castaneda MD  Primary Care Provider: Primary Doctor No    HPI:  Tere Peña is a 32 y.o. [id number] who is POD#4 s/p LTCS for arrest of fetal descent complicated by PPH with EBL of 1930. She presented to [facility] ED with worsened swelling in her bilateral lower extremities and noticed facial swelling as well. A blood pressure reading at home was 134/97. She was found to have elevated blood pressures (147/90) in the ED. She was She noted visual changes (blurring) but no scotomata, and continues to have visual blurring. She notes pressure in the back of her head but denies headaches. She denies chest pain, SOB, RUQ pain, still notes vision blurring, and has continued lower extremity swelling. She is still voiding without difficulty and has had one BM. She is passing gas and eating without n/v. She complains of moderate abdominal pain, controlled on her home medications.  She was started on magnesium in the [facility] ED due to concern for pre-eclampsia given elevated blood pressures and visual symptoms and was transferred for continued management.   Of note, her baby was seen as well for decreased activity and low blood sugars. He is in the room with her.    Hospital Course:  2017 - Patient admitted following worsened lower extremity swelling and facial swelling with new onset elevated BP.  P/C unable to calculate low, although LFTs elevated (81/73). Started on MgSO4 for preE with SF (LFTs).   2017 - MgSO4 discontinued after 24h. PreE ROS negative. BPs mostly normal with occasional mild range.     * No surgery found *         Significant Diagnostic Studies: Labs:   CMP   Recent Labs  Lab 17  0136 17  0505    138    K 4.3 3.8    106   CO2 23 25   GLU 94 98   BUN 14 7   CREATININE 0.6 0.6   CALCIUM 8.5* 7.2*   PROT 6.1 5.6*   ALBUMIN 2.5* 2.4*   BILITOT 0.3 0.3   ALKPHOS 136* 111   AST 81* 91*   ALT 73* 97*   ANIONGAP 8 7*   ESTGFRAFRICA >60.0 >60   EGFRNONAA >60.0 >60    and CBC   Recent Labs  Lab 08/30/17  0136 08/31/17  0506   WBC 8.80 8.71   HGB 9.7* 9.5*   HCT 28.1* 28.5*    284       Pending Diagnostic Studies:     None        Final Active Diagnoses:    Diagnosis Date Noted POA    PRINCIPAL PROBLEM:  Preeclampsia in postpartum period [O14.95] 08/30/2017 Yes      Problems Resolved During this Admission:    Diagnosis Date Noted Date Resolved POA        Discharged Condition: good    Disposition: Home or Self Care    Follow Up:  Follow-up Information     Maya Falcon MD In 1 week.    Specialties:  Obstetrics, Obstetrics and Gynecology  Why:  BP check  Contact information:  [address] [place] [id number]  [phone]                 Patient Instructions:     Diet general     Activity as tolerated     Call MD for:  temperature >100.4     Call MD for:  persistent nausea and vomiting or diarrhea     Call MD for:  severe uncontrolled pain     Call MD for:  redness, tenderness, or signs of infection (pain, swelling, redness, odor or green/yellow discharge around incision site)     Call MD for:  difficulty breathing or increased cough     Call MD for:  severe persistent headache     Call MD for:  persistent dizziness, light-headedness, or visual disturbances     Call MD for:  increased confusion or weakness       Medications:  Reconciled Home Medications:   Current Discharge Medication List      CONTINUE these medications which have NOT CHANGED    Details   ibuprofen (ADVIL,MOTRIN) 600 MG tablet Take 1 tablet (600 mg total) by mouth every 6 (six) hours as needed for Pain.  Qty: 60 tablet, Refills: 3      ondansetron (ZOFRAN-ODT) 4 MG TbDL Take 2 tablets (8 mg total) by mouth every 8 (eight)  hours.  Qty: 60 tablet, Refills: 3      oxycodone-acetaminophen (PERCOCET) 5-325 mg per tablet Take 1 tablet by mouth every 4 (four) hours as needed.  Qty: 20 tablet, Refills: 0      PNV59-iron,carb,fum-FA-DSS-dha (CITRANATAL HARMONY, IRON FUM,) 27 mg iron-1 mg -50 mg-260 mg Cap Take 1 capsule by mouth once daily.  Qty: 30 capsule, Refills: 12             Torin Castaneda MD  Obstetrics & Gynecology  [facility]

## 2017-08-31 NOTE — MEDICAL/APP STUDENT
Magnesium Assessment Note    Subjective:         Tere Peña is a 32 y.o. female at 40w0d on IV MgSO4 for seizure prophylaxis.    Patient denies headaches, denies blurry vision and denies right upper quadrant pain. Denies CP, denies SOB. Patient reports {Nausea/vomitin}.     Objective:      Temp:  [97.5 °F (36.4 °C)-98.5 °F (36.9 °C)] 97.8 °F (36.6 °C)  Pulse:  [68-96] 78  Resp:  [18-37] 20  SpO2:  [94 %-99 %] 98 %  BP: ()/(57-90) 121/77    I/O last 3 completed shifts:  In: 1200 [I.V.:1200]  Out: 6025 [Urine:6025]  No intake/output data recorded.    General:   alert, appears stated age and cooperative   Lungs:   clear to auscultation bilaterally   Heart::   regular rate and rhythm, S1, S2 normal, no murmur, click, rub or gallop   Extremities: peripheral pulses normal, no pedal edema, no clubbing or cyanosis   DTRs- 2+  bilaterally         Lab Review  Recent Results (from the past 24 hour(s))   CBC auto differential    Collection Time: 17  1:36 AM   Result Value Ref Range    WBC 8.80 3.90 - 12.70 K/uL    RBC 3.33 (L) 4.00 - 5.40 M/uL    Hemoglobin 9.7 (L) 12.0 - 16.0 g/dL    Hematocrit 28.1 (L) 37.0 - 48.5 %    MCV 84 82 - 98 fL    MCH 29.1 27.0 - 31.0 pg    MCHC 34.5 32.0 - 36.0 g/dL    RDW 13.2 11.5 - 14.5 %    Platelets 275 150 - 350 K/uL    MPV 9.0 (L) 9.2 - 12.9 fL    Gran # 6.6 1.8 - 7.7 K/uL    Lymph # 1.4 1.0 - 4.8 K/uL    Mono # 0.5 0.3 - 1.0 K/uL    Eos # 0.2 0.0 - 0.5 K/uL    Baso # 0.01 0.00 - 0.20 K/uL    Gran% 74.8 (H) 38.0 - 73.0 %    Lymph% 16.0 (L) 18.0 - 48.0 %    Mono% 5.6 4.0 - 15.0 %    Eosinophil% 2.5 0.0 - 8.0 %    Basophil% 0.1 0.0 - 1.9 %    Differential Method Automated    Comprehensive metabolic panel    Collection Time: 17  1:36 AM   Result Value Ref Range    Sodium 140 136 - 145 mmol/L    Potassium 4.3 3.5 - 5.1 mmol/L    Chloride 109 95 - 110 mmol/L    CO2 23 23 - 29 mmol/L    Glucose 94 70 - 110 mg/dL    BUN, Bld 14 6 - 20 mg/dL    Creatinine 0.6 0.5 - 1.4 mg/dL     Calcium 8.5 (L) 8.7 - 10.5 mg/dL    Total Protein 6.1 6.0 - 8.4 g/dL    Albumin 2.5 (L) 3.5 - 5.2 g/dL    Total Bilirubin 0.3 0.1 - 1.0 mg/dL    Alkaline Phosphatase 136 (H) 55 - 135 U/L    AST 81 (H) 10 - 40 U/L    ALT 73 (H) 10 - 44 U/L    Anion Gap 8 8 - 16 mmol/L    eGFR if African American >60.0 >60 mL/min/1.73 m^2    eGFR if non African American >60.0 >60 mL/min/1.73 m^2   Magnesium    Collection Time: 17  1:36 AM   Result Value Ref Range    Magnesium 1.8 1.6 - 2.6 mg/dL   Urinalysis, Reflex to Urine Culture Urine, Clean Catch    Collection Time: 17  1:36 AM   Result Value Ref Range    Specimen UA Urine, Clean Catch     Color, UA Straw Yellow, Straw, Kat    Appearance, UA Clear Clear    pH, UA 7.0 5.0 - 8.0    Specific Gravity, UA 1.010 1.005 - 1.030    Protein, UA Negative Negative    Glucose, UA Negative Negative    Ketones, UA Negative Negative    Bilirubin (UA) Negative Negative    Occult Blood UA 2+ (A) Negative    Nitrite, UA Negative Negative    Urobilinogen, UA Negative <2.0 EU/dL    Leukocytes, UA Negative Negative   Urinalysis Microscopic    Collection Time: 17  1:36 AM   Result Value Ref Range    RBC, UA 2 0 - 4 /hpf    WBC, UA 1 0 - 5 /hpf    Bacteria, UA Rare None-Occ /hpf    Squam Epithel, UA 1 /hpf    Microscopic Comment SEE COMMENT    Protein / creatinine ratio, urine    Collection Time: 17 11:15 AM   Result Value Ref Range    Protein, Urine Random <7 0 - 15 mg/dL    Creatinine, Random Ur 13.1 (L) 15.0 - 325.0 mg/dL    Prot/Creat Ratio, Ur Unable to calculate 0.00 - 0.20        Assessment:     Tere Peña is a 32 y.o.  who is POD#4 s/p LTCS, on MgSO4 for postpartum pre-eclampsia.     Plan:     - Continue magnesium sulfate, no signs of toxicity at this time  - Close maternal monitoring including UOP and BP   - BPs: (113-130)/(73-86) over 4hrs   - UOP: 314 cc/hr  - Recheck in 4 hrs (23:30)      Luis Daniel Tinsley

## 2017-08-31 NOTE — PLAN OF CARE
Problem: Patient Care Overview  Goal: Plan of Care Review  Outcome: Ongoing (interventions implemented as appropriate)  Fundus firm and midline with light bleeding. Incision CDI with no drainage or s/s of infection. Remains on bedrest with teds and SCD's. Minimal c/o pain well controlled with scheduled and PRN medications. Adequate intake and output. VSS. No distress noted

## 2017-08-31 NOTE — SUBJECTIVE & OBJECTIVE
Interval History:     Doing well this morning. Pt denies HA/vision changes/CP/SOB. Denies lightheadedness/dizziness. Denies RUQ or epigastric pain. She feels her LE and facial swelling are decreasing.     Scheduled Meds:   ibuprofen  600 mg Oral Q6H     Continuous Infusions:   benzocaine-lanolin-aloe vera      lactated Ringers 50 mL/hr at 08/31/17 0234    magnesium sulfate in water 2 g/hr (08/31/17 0229)     PRN Meds:acetaminophen, benzocaine-lanolin-aloe vera, calcium gluconate, diphenhydrAMINE, docusate sodium, hydrocodone-acetaminophen 5-325mg, hydrocodone-acetaminophen 7.5-325mg, hydrocortisone, lanolin, ondansetron, promethazine (PHENERGAN) IVPB    Review of patient's allergies indicates:  No Known Allergies    Objective:     Vital Signs (Most Recent):  Temp: 98.2 °F (36.8 °C) (08/31/17 0000)  Pulse: 83 (08/31/17 0305)  Resp: 16 (08/31/17 0000)  BP: 116/73 (08/31/17 0305)  SpO2: 98 % (08/31/17 0305) Vital Signs (24h Range):  Temp:  [97.5 °F (36.4 °C)-98.5 °F (36.9 °C)] 98.2 °F (36.8 °C)  Pulse:  [68-96] 83  Resp:  [16-37] 16  SpO2:  [94 %-99 %] 98 %  BP: ()/(57-89) 116/73        There is no height or weight on file to calculate BMI.  No LMP recorded (lmp unknown).    I&O (Last 24H):  I/O last 3 completed shifts:  In: 1200 [I.V.:1200]  Out: 6025 [Urine:6025]    Laboratory:    Recent Labs  Lab 08/25/17  0900 08/27/17  0543 08/30/17  0136 08/30/17  1115   WBC 15.12* 14.80* 8.80  --    HGB 12.9 8.4* 9.7*  --    HCT 38.4 25.1* 28.1*  --     188 275  --    BUN  --   --  14  --    CREATININE  --   --  0.6  --    ALT  --   --  73*  --    AST  --   --  81*  --    MG  --   --  1.8  --    UTPCR  --   --   --  Unable to calculate          Diagnostic Results:  N/A    Physical Exam:   Constitutional: She is oriented to person, place, and time. She appears well-developed and well-nourished. No distress.    HENT:   Head: Normocephalic and atraumatic.      Cardiovascular: Normal rate and regular rhythm.      Pulmonary/Chest: Effort normal.        Abdominal: Soft. Bowel sounds are normal. She exhibits abdominal incision (c/d/i). She exhibits no distension. There is no tenderness (appropriate, generalized). There is no rebound and no guarding.     Genitourinary:   Genitourinary Comments: Fundus firm below umbilicus           Musculoskeletal: She exhibits no edema.       Neurological: She is alert and oriented to person, place, and time.    Skin: Skin is warm and dry.    Psychiatric: She has a normal mood and affect.

## 2017-08-31 NOTE — PROGRESS NOTES
Magnesium Assessment Note    Subjective:         Tere Peña is a 32 y.o.  who is POD#4 s/p LTCS for arrest of fetal descent, on MgSO4 for postpartum seizure prophylaxis.    Patient denies headaches, denies blurry vision and denies right upper quadrant pain. Denies CP, denies SOB. Patient reports no nausea/no vomiting.     Objective:      Temp:  [97.5 °F (36.4 °C)-98.5 °F (36.9 °C)] 98.2 °F (36.8 °C)  Pulse:  [68-96] 80  Resp:  [18-37] 18  SpO2:  [94 %-99 %] 99 %  BP: ()/(57-90) 144/86    I/O last 3 completed shifts:  In: 1200 [I.V.:1200]  Out: 6025 [Urine:6025]  I/O this shift:  In: -   Out: 1875 [Urine:1875]    General:   alert, appears stated age and cooperative   Lungs:   clear to auscultation bilaterally   Heart::   regular rate and rhythm, S1, S2 normal, no murmur, click, rub or gallop   Extremities: peripheral pulses normal, no pedal edema, no clubbing or cyanosis   DTRs- 2+  bilaterally       Lab Review  Recent Results (from the past 24 hour(s))   CBC auto differential    Collection Time: 17  1:36 AM   Result Value Ref Range    WBC 8.80 3.90 - 12.70 K/uL    RBC 3.33 (L) 4.00 - 5.40 M/uL    Hemoglobin 9.7 (L) 12.0 - 16.0 g/dL    Hematocrit 28.1 (L) 37.0 - 48.5 %    MCV 84 82 - 98 fL    MCH 29.1 27.0 - 31.0 pg    MCHC 34.5 32.0 - 36.0 g/dL    RDW 13.2 11.5 - 14.5 %    Platelets 275 150 - 350 K/uL    MPV 9.0 (L) 9.2 - 12.9 fL    Gran # 6.6 1.8 - 7.7 K/uL    Lymph # 1.4 1.0 - 4.8 K/uL    Mono # 0.5 0.3 - 1.0 K/uL    Eos # 0.2 0.0 - 0.5 K/uL    Baso # 0.01 0.00 - 0.20 K/uL    Gran% 74.8 (H) 38.0 - 73.0 %    Lymph% 16.0 (L) 18.0 - 48.0 %    Mono% 5.6 4.0 - 15.0 %    Eosinophil% 2.5 0.0 - 8.0 %    Basophil% 0.1 0.0 - 1.9 %    Differential Method Automated    Comprehensive metabolic panel    Collection Time: 17  1:36 AM   Result Value Ref Range    Sodium 140 136 - 145 mmol/L    Potassium 4.3 3.5 - 5.1 mmol/L    Chloride 109 95 - 110 mmol/L    CO2 23 23 - 29 mmol/L    Glucose 94 70 - 110 mg/dL     BUN, Bld 14 6 - 20 mg/dL    Creatinine 0.6 0.5 - 1.4 mg/dL    Calcium 8.5 (L) 8.7 - 10.5 mg/dL    Total Protein 6.1 6.0 - 8.4 g/dL    Albumin 2.5 (L) 3.5 - 5.2 g/dL    Total Bilirubin 0.3 0.1 - 1.0 mg/dL    Alkaline Phosphatase 136 (H) 55 - 135 U/L    AST 81 (H) 10 - 40 U/L    ALT 73 (H) 10 - 44 U/L    Anion Gap 8 8 - 16 mmol/L    eGFR if African American >60.0 >60 mL/min/1.73 m^2    eGFR if non African American >60.0 >60 mL/min/1.73 m^2   Magnesium    Collection Time: 17  1:36 AM   Result Value Ref Range    Magnesium 1.8 1.6 - 2.6 mg/dL   Urinalysis, Reflex to Urine Culture Urine, Clean Catch    Collection Time: 17  1:36 AM   Result Value Ref Range    Specimen UA Urine, Clean Catch     Color, UA Straw Yellow, Straw, Kat    Appearance, UA Clear Clear    pH, UA 7.0 5.0 - 8.0    Specific Gravity, UA 1.010 1.005 - 1.030    Protein, UA Negative Negative    Glucose, UA Negative Negative    Ketones, UA Negative Negative    Bilirubin (UA) Negative Negative    Occult Blood UA 2+ (A) Negative    Nitrite, UA Negative Negative    Urobilinogen, UA Negative <2.0 EU/dL    Leukocytes, UA Negative Negative   Urinalysis Microscopic    Collection Time: 17  1:36 AM   Result Value Ref Range    RBC, UA 2 0 - 4 /hpf    WBC, UA 1 0 - 5 /hpf    Bacteria, UA Rare None-Occ /hpf    Squam Epithel, UA 1 /hpf    Microscopic Comment SEE COMMENT    Protein / creatinine ratio, urine    Collection Time: 17 11:15 AM   Result Value Ref Range    Protein, Urine Random <7 0 - 15 mg/dL    Creatinine, Random Ur 13.1 (L) 15.0 - 325.0 mg/dL    Prot/Creat Ratio, Ur Unable to calculate 0.00 - 0.20        Assessment:   Tere Peña is a 32 y.o.  who is POD#4 s/p LTCS, on MgSO4 for postpartum pre-eclampsia.        Plan:     - Continue magnesium sulfate, no signs of toxicity at this time  - Close maternal monitoring including UOP and BP                        - BPs: (119-137)/(73-82) over last 4hrs                        - UOP:  450 cc/hr over last 4hrs  - Recheck in 4 hrs (03:30)      Deja Gallegos MD

## 2017-09-06 ENCOUNTER — PATIENT MESSAGE (OUTPATIENT)
Dept: OBSTETRICS AND GYNECOLOGY | Facility: CLINIC | Age: 32
End: 2017-09-06

## 2017-09-07 ENCOUNTER — OFFICE VISIT (OUTPATIENT)
Dept: OBSTETRICS AND GYNECOLOGY | Facility: CLINIC | Age: 32
End: 2017-09-07
Payer: MEDICAID

## 2017-09-07 VITALS
DIASTOLIC BLOOD PRESSURE: 64 MMHG | WEIGHT: 149.56 LBS | SYSTOLIC BLOOD PRESSURE: 100 MMHG | TEMPERATURE: 99 F | BODY MASS INDEX: 25.53 KG/M2 | HEIGHT: 64 IN

## 2017-09-07 DIAGNOSIS — Z48.89 ENCOUNTER FOR POST SURGICAL WOUND CHECK: Primary | ICD-10-CM

## 2017-09-07 DIAGNOSIS — K59.00 CONSTIPATION, UNSPECIFIED CONSTIPATION TYPE: ICD-10-CM

## 2017-09-07 PROBLEM — Z34.00 SUPERVISION OF NORMAL FIRST PREGNANCY: Status: RESOLVED | Noted: 2017-01-24 | Resolved: 2017-09-07

## 2017-09-07 PROCEDURE — 99213 OFFICE O/P EST LOW 20 MIN: CPT | Mod: PBBFAC,PN

## 2017-09-07 PROCEDURE — 99999 PR PBB SHADOW E&M-EST. PATIENT-LVL III: CPT | Mod: PBBFAC,,,

## 2017-09-07 NOTE — PHYSICIAN QUERY
PT Name: Tere Peña  MR #: [id number]    Physician Query Form -Present on Admission (POA) Diagnosis Clarification     CDS/: Kelli Villa RN  CCDS               Contact information: [email or web address]     This form is a permanent document in the medical record.     Query Date: 2017    By submitting this query, we are merely seeking further clarification of documentation. Please utilize your independent clinical judgment when addressing the question(s) below.       The Medical record contains the following:    Diagnosis      Supporting Clinical Information   Location in Medical Record    ruptured membranes            Indications for induction: Premature ROM      SRM (Spontaneous Rupture)     32 y.o. F at 39w6d presents complaining of contractions q3-5 minutes. She was 1-2 cm dilated in clinic today. She also feels that she has been leaking but denies big gush of fluid. She was not found to be ruptured in [place], but cervix changed from 2 to 4 cm.       Rupture of Membranes      Discussed lack of cervical change x 9 hours with ruptured membranes for 12h with patient and family.       Delivery Summary       Delivery Summary        H&P                       ED Provider note     OB progress note  @23:57       Doctor, Please specify Present On Admission (POA) status of ruptured membranes:    [x  ] Present on Admission   [  ] Not Present on Admission  [  ] Clinically undetermined

## 2017-09-07 NOTE — PROGRESS NOTES
Postpartum Visit  Tere Peña is a 32 y.o. female  is here for a postpartum incision check. She is here with her  today. She is 2 weeks postpartum following a low cervical transverse  section, of a male infant for arrest of dilation. The delivery was at 40w0d. Pt c/o incisional pain today that is worse with activity. Reports pain is a 6/10. She has not taken anything for the pain. Reports uterine cramping. Denies fever, chills, or body aches. Does not want to take percocet because of constipation, but has motrin at home. Last BM 2-3 days ago. Pt is taking a stool softener and fiber supplement. States she is not straining with BMs just not as regular as before. Denies headaches, vision changes, or RUQ pain. BP WNL today. Light bleeding. Breast feeding.     Pregnancy was complicated by: anemia, arrest of dilation, pre eclampsia in PP period.      OB History    Para Term  AB Living   1 1 1     1   SAB TAB Ectopic Multiple Live Births         0 1      # Outcome Date GA Lbr Bhupinder/2nd Weight Sex Delivery Anes PTL Lv   1 Term 17 40w0d  4.024 kg (8 lb 13.9 oz) M CS-LTranv EPI N SOCO      Complications: Failure to Progress in First Stage            ROS:  GENERAL: No fever, chills, fatigability.  VULVAR: No pain, no lesions and no itching.  VAGINAL: No relaxation, no itching, no discharge, no abnormal bleeding and no lesions.  ABDOMEN: No abdominal pain. Denies nausea. Denies vomiting. No diarrhea. No constipation  BREAST: Denies pain. No lumps. No discharge.  URINARY: No incontinence, no nocturia, no frequency and no dysuria.  CARDIOVASCULAR: No chest pain. No shortness of breath. No leg cramps.  NEUROLOGICAL: No headaches. No vision changes.      General appearance - alert, well appearing, and in no distress, oriented to person, place, and time and normal appearing weight  Mental status - alert, oriented to person, place, and time, normal mood, behavior, speech, dress, motor activity,  and thought processes  Skin - coloration normal for race, good turgor, warm to touch, no rashes  Abdomen - soft, nontender, nondistended, no masses or organomegaly  Pfannenstiel incision: Clean, dry, intact - healing well. Slight erythema in middle of incision. No swelling or drainage. Discussed protecting incision with brigid pad. Reviewed signs of infection. Afebrile in clinic today.   Pelvic -   External genitalia postpartum: normal, well-healed, without lesions or masses.  Normal female hair distribution. Adequate perineal body. Urethral meatus without lesions or prolapse. Urethra: no masses, tenderness, or scarring.  Bladder: without tenderness or masses.  Vaginal mucosa moist and pink, normal rugae, without lesions, abnormal discharge, or foul odor.  Cervix pink, no lesions, no cervical motion tenderness.  Uterus: midline, non tender, smooth, not enlarged, not prolapsed  No adnexal masses or tenderness.  Extremities - no edema, redness or tenderness in the calves or thighs      Tere was seen today for postpartum care.    Diagnoses and all orders for this visit:    Encounter for post surgical wound check    Constipation, unspecified constipation type        -RTC for PP visit  -motrin prn for pain  -discussed incision care and water/fiber/stool softner  -reviewed PP warning signs and when to go to ED